# Patient Record
Sex: FEMALE | Race: WHITE | Employment: PART TIME | URBAN - METROPOLITAN AREA
[De-identification: names, ages, dates, MRNs, and addresses within clinical notes are randomized per-mention and may not be internally consistent; named-entity substitution may affect disease eponyms.]

---

## 2021-03-30 ENCOUNTER — HOSPITAL ENCOUNTER (OUTPATIENT)
Age: 51
Discharge: HOME | End: 2021-04-01

## 2021-03-30 PROCEDURE — 88360 TUMOR IMMUNOHISTOCHEM/MANUAL: CPT

## 2021-03-30 PROCEDURE — 88305 TISSUE EXAM BY PATHOLOGIST: CPT

## 2021-05-03 ENCOUNTER — HOSPITAL ENCOUNTER (OUTPATIENT)
Age: 51
Discharge: HOME | End: 2021-05-05

## 2021-05-03 PROCEDURE — 88307 TISSUE EXAM BY PATHOLOGIST: CPT

## 2021-05-04 ENCOUNTER — HOSPITAL ENCOUNTER (OUTPATIENT)
Age: 51
Discharge: HOME | End: 2021-05-06

## 2021-05-04 LAB
ANION GAP SERPL CALCULATED.3IONS-SCNC: 9 MMOL/L (ref 7–16)
BUN BLDV-MCNC: 10 MG/DL (ref 6–20)
CALCIUM SERPL-MCNC: 9.1 MG/DL (ref 8.6–10.2)
CHLORIDE BLD-SCNC: 104 MMOL/L (ref 98–107)
CO2: 27 MMOL/L (ref 22–29)
CREAT SERPL-MCNC: 0.8 MG/DL (ref 0.5–1)
GFR AFRICAN AMERICAN: >60
GFR NON-AFRICAN AMERICAN: >60 ML/MIN/1.73
GLUCOSE BLD-MCNC: 106 MG/DL (ref 74–99)
HCT VFR BLD CALC: 36.2 % (ref 34–48)
HEMOGLOBIN: 11.7 G/DL (ref 11.5–15.5)
POTASSIUM SERPL-SCNC: 4.2 MMOL/L (ref 3.5–5)
SODIUM BLD-SCNC: 140 MMOL/L (ref 132–146)

## 2021-05-04 PROCEDURE — 85014 HEMATOCRIT: CPT

## 2021-05-04 PROCEDURE — 85018 HEMOGLOBIN: CPT

## 2021-05-04 PROCEDURE — 80048 BASIC METABOLIC PNL TOTAL CA: CPT

## 2021-05-14 ENCOUNTER — APPOINTMENT (OUTPATIENT)
Dept: ULTRASOUND IMAGING | Age: 51
End: 2021-05-14
Payer: COMMERCIAL

## 2021-05-14 ENCOUNTER — HOSPITAL ENCOUNTER (EMERGENCY)
Age: 51
Discharge: HOME | End: 2021-05-14
Attending: EMERGENCY MEDICINE
Payer: COMMERCIAL

## 2021-05-14 VITALS
HEART RATE: 72 BPM | DIASTOLIC BLOOD PRESSURE: 112 MMHG | WEIGHT: 190 LBS | TEMPERATURE: 97.4 F | BODY MASS INDEX: 37.3 KG/M2 | SYSTOLIC BLOOD PRESSURE: 189 MMHG | RESPIRATION RATE: 16 BRPM | HEIGHT: 60 IN | OXYGEN SATURATION: 99 %

## 2021-05-14 DIAGNOSIS — I82.621 ACUTE DEEP VEIN THROMBOSIS (DVT) OF OTHER VEIN OF RIGHT UPPER EXTREMITY (HCC): Primary | ICD-10-CM

## 2021-05-14 LAB
ALBUMIN SERPL-MCNC: 3.9 G/DL (ref 3.5–5.2)
ALP BLD-CCNC: 67 U/L (ref 35–104)
ALT SERPL-CCNC: 12 U/L (ref 0–32)
ANION GAP SERPL CALCULATED.3IONS-SCNC: 7 MMOL/L (ref 7–16)
AST SERPL-CCNC: 14 U/L (ref 0–31)
BASOPHILS ABSOLUTE: 0.02 E9/L (ref 0–0.2)
BASOPHILS RELATIVE PERCENT: 0.2 % (ref 0–2)
BILIRUB SERPL-MCNC: <0.2 MG/DL (ref 0–1.2)
BUN BLDV-MCNC: 11 MG/DL (ref 6–20)
CALCIUM SERPL-MCNC: 9.6 MG/DL (ref 8.6–10.2)
CHLORIDE BLD-SCNC: 100 MMOL/L (ref 98–107)
CO2: 31 MMOL/L (ref 22–29)
CREAT SERPL-MCNC: 0.7 MG/DL (ref 0.5–1)
EOSINOPHILS ABSOLUTE: 0.46 E9/L (ref 0.05–0.5)
EOSINOPHILS RELATIVE PERCENT: 4.7 % (ref 0–6)
GFR AFRICAN AMERICAN: >60
GFR NON-AFRICAN AMERICAN: >60 ML/MIN/1.73
GLUCOSE BLD-MCNC: 83 MG/DL (ref 74–99)
HCT VFR BLD CALC: 37.1 % (ref 34–48)
HEMOGLOBIN: 11.8 G/DL (ref 11.5–15.5)
IMMATURE GRANULOCYTES #: 0.08 E9/L
IMMATURE GRANULOCYTES %: 0.8 % (ref 0–5)
LYMPHOCYTES ABSOLUTE: 3.11 E9/L (ref 1.5–4)
LYMPHOCYTES RELATIVE PERCENT: 31.6 % (ref 20–42)
MCH RBC QN AUTO: 28.7 PG (ref 26–35)
MCHC RBC AUTO-ENTMCNC: 31.8 % (ref 32–34.5)
MCV RBC AUTO: 90.3 FL (ref 80–99.9)
MONOCYTES ABSOLUTE: 0.68 E9/L (ref 0.1–0.95)
MONOCYTES RELATIVE PERCENT: 6.9 % (ref 2–12)
NEUTROPHILS ABSOLUTE: 5.48 E9/L (ref 1.8–7.3)
NEUTROPHILS RELATIVE PERCENT: 55.8 % (ref 43–80)
PDW BLD-RTO: 13.8 FL (ref 11.5–15)
PLATELET # BLD: 314 E9/L (ref 130–450)
PMV BLD AUTO: 9.9 FL (ref 7–12)
POTASSIUM SERPL-SCNC: 4.2 MMOL/L (ref 3.5–5)
RBC # BLD: 4.11 E12/L (ref 3.5–5.5)
SODIUM BLD-SCNC: 138 MMOL/L (ref 132–146)
TOTAL PROTEIN: 6.6 G/DL (ref 6.4–8.3)
WBC # BLD: 9.8 E9/L (ref 4.5–11.5)

## 2021-05-14 PROCEDURE — 85025 COMPLETE CBC W/AUTO DIFF WBC: CPT

## 2021-05-14 PROCEDURE — 96372 THER/PROPH/DIAG INJ SC/IM: CPT

## 2021-05-14 PROCEDURE — 99284 EMERGENCY DEPT VISIT MOD MDM: CPT

## 2021-05-14 PROCEDURE — 80053 COMPREHEN METABOLIC PANEL: CPT

## 2021-05-14 PROCEDURE — 6360000002 HC RX W HCPCS: Performed by: STUDENT IN AN ORGANIZED HEALTH CARE EDUCATION/TRAINING PROGRAM

## 2021-05-14 PROCEDURE — 93971 EXTREMITY STUDY: CPT

## 2021-05-14 RX ADMIN — ENOXAPARIN SODIUM 90 MG: 100 INJECTION SUBCUTANEOUS at 23:21

## 2021-05-14 ASSESSMENT — ENCOUNTER SYMPTOMS
PHOTOPHOBIA: 0
VOMITING: 0
NAUSEA: 0
APNEA: 0
DIARRHEA: 0
BACK PAIN: 0
WHEEZING: 0
EYE PAIN: 0
ABDOMINAL PAIN: 0
SHORTNESS OF BREATH: 0
TROUBLE SWALLOWING: 0
COUGH: 0
CONSTIPATION: 0
CHEST TIGHTNESS: 0
RHINORRHEA: 0
SORE THROAT: 0

## 2021-05-14 ASSESSMENT — PAIN DESCRIPTION - DESCRIPTORS: DESCRIPTORS: BURNING

## 2021-05-14 ASSESSMENT — PAIN DESCRIPTION - PAIN TYPE: TYPE: ACUTE PAIN

## 2021-05-14 ASSESSMENT — PAIN DESCRIPTION - ORIENTATION: ORIENTATION: RIGHT

## 2021-05-14 ASSESSMENT — PAIN DESCRIPTION - LOCATION: LOCATION: ARM

## 2021-05-14 ASSESSMENT — PAIN DESCRIPTION - FREQUENCY: FREQUENCY: CONTINUOUS

## 2021-05-15 NOTE — ED PROVIDER NOTES
[facility] ENCOUNTER      Pt Name: Edith Ga  MRN: [id number]  [place] 1970  Date of evaluation: 5/14/2021      CHIEF COMPLAINT       Chief Complaint   Patient presents with    Arm Pain     RUE, pt states pain worsening since wednesday. Area is red and swollen. States had double masectomy on 5/3. Denies CP/SOB. Denies thinners. HPI  Edith Ga is a 52 y.o. female with history of breast cancer status post mastectomy who presents to the emergency department with swelling and redness to the right upper extremity. Patient states that over the last 2 days or so she has had gradually worsening swelling and redness. She describes her symptoms as constant, worsening with time, mild to moderate. Nothing makes her symptoms better. She denies any associated chest pain or shortness of breath. She is not any blood thinners. There were no complications otherwise from the mastectomy. Except as noted above the remainder of the review of systems was reviewed and negative. Review of Systems   Constitutional: Negative for chills, diaphoresis, fatigue and fever. HENT: Negative for rhinorrhea, sore throat and trouble swallowing. Eyes: Negative for photophobia and pain. Respiratory: Negative for apnea, cough, chest tightness, shortness of breath and wheezing. Cardiovascular: Negative for chest pain, palpitations and leg swelling. Gastrointestinal: Negative for abdominal pain, constipation, diarrhea, nausea and vomiting. Endocrine: Negative for polyuria. Genitourinary: Negative for difficulty urinating and dysuria. Musculoskeletal: Negative for back pain, neck pain and neck stiffness. Reports swelling to right arm   Skin: Negative for pallor and rash. Reports erythema to right arm   Neurological: Negative for dizziness, speech difficulty, weakness, light-headedness and headaches. Psychiatric/Behavioral: Negative for confusion. The patient is not nervous/anxious. Physical Exam  Vitals signs and nursing note reviewed. Constitutional:       General: She is not in acute distress. Appearance: She is well-developed. Comments: Awake and alert. Sitting in the gurney in no obvious distress. HENT:      Head: Normocephalic and atraumatic. Right Ear: External ear normal.      Left Ear: External ear normal.      Mouth/Throat:      Mouth: Mucous membranes are moist.   Eyes:      General: No scleral icterus. Pupils: Pupils are equal, round, and reactive to light. Neck:      Musculoskeletal: Normal range of motion and neck supple. Cardiovascular:      Rate and Rhythm: Normal rate and regular rhythm. Heart sounds: No murmur. Comments: 2+ radial and dorsal pedis pulses bilaterally  Pulmonary:      Effort: Pulmonary effort is normal. No respiratory distress. Breath sounds: Normal breath sounds. No wheezing. Abdominal:      Palpations: Abdomen is soft. Tenderness: There is no abdominal tenderness. There is no guarding or rebound. Musculoskeletal: Normal range of motion. General: No tenderness or deformity. Right lower leg: No edema. Left lower leg: No edema. Skin:     General: Skin is warm and dry. Capillary Refill: Capillary refill takes less than 2 seconds. Comments: Mild erythema and mild swelling to the proximal right upper extremity. Neurological:      General: No focal deficit present. Mental Status: She is alert and oriented to person, place, and time. Cranial Nerves: No cranial nerve deficit. Sensory: No sensory deficit. Motor: No weakness or abnormal muscle tone.    Psychiatric:         Mood and Affect: Mood normal.         Behavior: Behavior normal.          Procedures     MDM   This is a 82-year-old female with a history of breast cancer status post mastectomy who presents with right arm swelling and erythema. In the emergency department she is awake and alert, hemodynamic stable, afebrile and in no respiratory distress. Ultrasound showed occlusive thrombus in right cephalic vein consistent with DVT. Lovenox administered. Prescription for Eliquis given. No chest pain or shortness of breath patient nonhypoxic and no signs or symptoms of pulmonary embolism. No signs of infection. Neurovascular intact distally. Discussed plan for charge home as well as return precautions and patient understands and agrees with plan.                  --------------------------------------------- PAST HISTORY ---------------------------------------------  Past Medical History:  has a past medical history of Cancer ([facility] [address] Depression, and Hypertension. Past Surgical History:  has a past surgical history that includes Breast surgery and Cholecystectomy. Social History:  reports that she has never smoked. She has never used smokeless tobacco. She reports previous alcohol use. She reports that she does not use drugs. Family History: family history is not on file. The patients home medications have been reviewed. Allergies: Patient has no known allergies.     -------------------------------------------------- RESULTS -------------------------------------------------  Labs:  Results for orders placed or performed during the hospital encounter of 05/14/21   CBC auto differential   Result Value Ref Range    WBC 9.8 4.5 - 11.5 E9/L    RBC 4.11 3.50 - 5.50 E12/L    Hemoglobin 11.8 11.5 - 15.5 g/dL    Hematocrit 37.1 34.0 - 48.0 %    MCV 90.3 80.0 - 99.9 fL    MCH 28.7 26.0 - 35.0 pg    MCHC 31.8 (L) 32.0 - 34.5 %    RDW 13.8 11.5 - 15.0 fL    Platelets 376 470 - 255 E9/L    MPV 9.9 7.0 - 12.0 fL    Neutrophils % 55.8 43.0 - 80.0 %    Immature Granulocytes % 0.8 0.0 - 5.0 %    Lymphocytes % 31.6 20.0 - 42.0 %    Monocytes % 6.9 2.0 - 12.0 %    Eosinophils % 4.7 0.0 - 6.0 %    Basophils % 0.2 0.0 - 2.0 % Neutrophils Absolute 5.48 1.80 - 7.30 E9/L    Immature Granulocytes # 0.08 E9/L    Lymphocytes Absolute 3.11 1.50 - 4.00 E9/L    Monocytes Absolute 0.68 0.10 - 0.95 E9/L    Eosinophils Absolute 0.46 0.05 - 0.50 E9/L    Basophils Absolute 0.02 0.00 - 0.20 E9/L       Radiology:  US DUP UPPER EXTREMITY RIGHT VENOUS   Final Result   Occlusive thrombus in the right cephalic vein. There is no color flow   identified within this vessel on color Doppler evaluation. Critical results were called by Dr. Anh Solomon to Dr. Kelly Gonzalez on 5/14/2021 at   22:58.             ------------------------- NURSING NOTES AND VITALS REVIEWED ---------------------------  Date / Time Roomed:  5/14/2021 11:00 PM  ED Bed Assignment:  17/17    The nursing notes within the ED encounter and vital signs as below have been reviewed. BP (!) 210/106   Pulse 72   Temp 97.4 °F (36.3 °C) (Oral)   Resp 16   Ht 5' (1.524 m)   Wt 190 lb (86.2 kg)   LMP 05/09/2021   SpO2 99%   BMI 37.11 kg/m²   Oxygen Saturation Interpretation: Normal      ------------------------------------------ PROGRESS NOTES ------------------------------------------  11:24 PM EDT  I have spoken with the patient and discussed todays results, in addition to providing specific details for the plan of care and counseling regarding the diagnosis and prognosis. Their questions are answered at this time and they are agreeable with the plan. I discussed at length with them reasons for immediate return here for re evaluation. They will followup with their primary care physician by calling their office tomorrow. --------------------------------- ADDITIONAL PROVIDER NOTES ---------------------------------  At this time the patient is without objective evidence of an acute process requiring hospitalization or inpatient management. They have remained hemodynamically stable throughout their entire ED visit and are stable for discharge with outpatient follow-up.      The plan has

## 2021-07-21 ENCOUNTER — HOSPITAL ENCOUNTER (OUTPATIENT)
Age: 51
Discharge: HOME | End: 2021-07-23

## 2021-07-21 PROCEDURE — 87075 CULTR BACTERIA EXCEPT BLOOD: CPT

## 2021-07-21 PROCEDURE — 87070 CULTURE OTHR SPECIMN AEROBIC: CPT

## 2021-07-21 PROCEDURE — 87205 SMEAR GRAM STAIN: CPT

## 2021-07-21 PROCEDURE — 88305 TISSUE EXAM BY PATHOLOGIST: CPT

## 2021-07-22 LAB
GRAM STAIN ORDERABLE: NORMAL
GRAM STAIN ORDERABLE: NORMAL

## 2021-07-24 LAB
WOUND/ABSCESS: NORMAL
WOUND/ABSCESS: NORMAL

## 2021-07-26 LAB
ANAEROBIC CULTURE: NORMAL
ANAEROBIC CULTURE: NORMAL

## 2021-09-15 ENCOUNTER — OFFICE VISIT (OUTPATIENT)
Dept: FAMILY MEDICINE CLINIC | Age: 51
End: 2021-09-15
Payer: COMMERCIAL

## 2021-09-15 VITALS
SYSTOLIC BLOOD PRESSURE: 108 MMHG | TEMPERATURE: 96.9 F | DIASTOLIC BLOOD PRESSURE: 70 MMHG | HEART RATE: 84 BPM | OXYGEN SATURATION: 99 % | WEIGHT: 195.6 LBS | HEIGHT: 60 IN | BODY MASS INDEX: 38.4 KG/M2 | RESPIRATION RATE: 16 BRPM

## 2021-09-15 DIAGNOSIS — Z00.00 ROUTINE HEALTH MAINTENANCE: Primary | ICD-10-CM

## 2021-09-15 DIAGNOSIS — Z12.11 COLON CANCER SCREENING: ICD-10-CM

## 2021-09-15 DIAGNOSIS — Z12.4 CERVICAL CANCER SCREENING: ICD-10-CM

## 2021-09-15 PROCEDURE — 99203 OFFICE O/P NEW LOW 30 MIN: CPT | Performed by: STUDENT IN AN ORGANIZED HEALTH CARE EDUCATION/TRAINING PROGRAM

## 2021-09-15 RX ORDER — BENAZEPRIL HYDROCHLORIDE 20 MG/1
TABLET ORAL
COMMUNITY
Start: 2021-07-30 | End: 2022-05-24 | Stop reason: ALTCHOICE

## 2021-09-15 RX ORDER — METOPROLOL SUCCINATE 25 MG/1
TABLET, EXTENDED RELEASE ORAL
COMMUNITY
End: 2022-05-24 | Stop reason: ALTCHOICE

## 2021-09-15 RX ORDER — LORAZEPAM 1 MG/1
1 TABLET ORAL 2 TIMES DAILY PRN
Status: ON HOLD | COMMUNITY
Start: 2021-08-13 | End: 2022-05-31 | Stop reason: HOSPADM

## 2021-09-15 RX ORDER — ASPIRIN 81 MG/1
81 TABLET ORAL DAILY
COMMUNITY

## 2021-09-15 RX ORDER — VENLAFAXINE HYDROCHLORIDE 150 MG/1
150 CAPSULE, EXTENDED RELEASE ORAL DAILY
Status: ON HOLD | COMMUNITY
Start: 2021-07-30 | End: 2022-05-31 | Stop reason: HOSPADM

## 2021-09-15 RX ORDER — FLUTICASONE PROPIONATE 50 MCG
1-2 SPRAY, SUSPENSION (ML) NASAL DAILY PRN
COMMUNITY
End: 2022-06-08

## 2021-09-15 RX ORDER — TAMOXIFEN CITRATE 20 MG/1
20 TABLET ORAL DAILY
COMMUNITY
Start: 2021-06-24

## 2021-09-15 SDOH — ECONOMIC STABILITY: FOOD INSECURITY: WITHIN THE PAST 12 MONTHS, THE FOOD YOU BOUGHT JUST DIDN'T LAST AND YOU DIDN'T HAVE MONEY TO GET MORE.: NEVER TRUE

## 2021-09-15 SDOH — ECONOMIC STABILITY: FOOD INSECURITY: WITHIN THE PAST 12 MONTHS, YOU WORRIED THAT YOUR FOOD WOULD RUN OUT BEFORE YOU GOT MONEY TO BUY MORE.: NEVER TRUE

## 2021-09-15 ASSESSMENT — ENCOUNTER SYMPTOMS
EYE PAIN: 0
NAUSEA: 0
RHINORRHEA: 0
CONSTIPATION: 1
SINUS PRESSURE: 0
COUGH: 0
ABDOMINAL PAIN: 0
EYE REDNESS: 0
BACK PAIN: 0
VOMITING: 0
DIARRHEA: 0
SHORTNESS OF BREATH: 0
SINUS PAIN: 0
SORE THROAT: 0

## 2021-09-15 ASSESSMENT — LIFESTYLE VARIABLES
HOW MANY STANDARD DRINKS CONTAINING ALCOHOL DO YOU HAVE ON A TYPICAL DAY: 1 OR 2
HOW OFTEN DO YOU HAVE A DRINK CONTAINING ALCOHOL: NEVER

## 2021-09-15 ASSESSMENT — PATIENT HEALTH QUESTIONNAIRE - PHQ9
SUM OF ALL RESPONSES TO PHQ QUESTIONS 1-9: 0
2. FEELING DOWN, DEPRESSED OR HOPELESS: 0
SUM OF ALL RESPONSES TO PHQ QUESTIONS 1-9: 0
SUM OF ALL RESPONSES TO PHQ9 QUESTIONS 1 & 2: 0
SUM OF ALL RESPONSES TO PHQ QUESTIONS 1-9: 0
1. LITTLE INTEREST OR PLEASURE IN DOING THINGS: 0

## 2021-09-15 ASSESSMENT — SOCIAL DETERMINANTS OF HEALTH (SDOH): HOW HARD IS IT FOR YOU TO PAY FOR THE VERY BASICS LIKE FOOD, HOUSING, MEDICAL CARE, AND HEATING?: NOT HARD AT ALL

## 2021-09-15 NOTE — PROGRESS NOTES
9/15/2021    Rocio Arroyo (:  1970) is a 52 y.o. female, here for evaluation of the following medical concerns:    HPI  Chief Complaint   Patient presents with    New Patient     Patient is a 61-year-old female here today to establish care with myself. She has a past medical history of hypertension breast cancer anxiety and depression. She has a past surgical history of breast surgery and cholecystectomy. She is allergic to bees. Her father had history of lung cancer and colon cancer runs in the family. She gets a colonoscopy yearly due to this history. She was seeing someone in [place]. She had a breast spacer removed and is getting it put back in on the . She is on the tamoxifen and has been on it for a few months. Her last pap smear was a while ago. She would like referral to OB/GYN. Review of Systems   Constitutional: Negative for chills, fatigue and fever. HENT: Negative for congestion, ear pain, postnasal drip, rhinorrhea, sinus pressure, sinus pain and sore throat. Eyes: Negative for pain and redness. Respiratory: Negative for cough and shortness of breath. Cardiovascular: Negative for chest pain. Gastrointestinal: Positive for constipation. Negative for abdominal pain, diarrhea, nausea and vomiting. Genitourinary: Negative for difficulty urinating and dysuria. Musculoskeletal: Negative for back pain, myalgias and neck pain. Skin: Negative for rash. Neurological: Negative for dizziness, light-headedness and numbness. Psychiatric/Behavioral: The patient is not nervous/anxious. Prior to Visit Medications    Medication Sig Taking?  Authorizing Provider   tamoxifen (NOLVADEX) 20 MG tablet TAKE 1 TABLET BY MOUTH EVERY DAY WHOLE WITH WATER Yes Historical Provider, MD   benazepril (LOTENSIN) 20 MG tablet TAKE 1 TABLET BY MOUTH EVERY DAY Yes Historical Provider, MD   fluticasone (FLONASE) 50 MCG/ACT nasal spray Flonase Allergy Relief 50 mcg/actuation nasal spray,suspension   Spray 1 spray every day by intranasal route. Yes Historical Provider, MD   LORazepam (ATIVAN) 1 MG tablet TAKE 1 TABLET BY MOUTH TWICE A DAY AS NEEDED FOR ANXIETY Yes Historical Provider, MD   metoprolol succinate (TOPROL XL) 25 MG extended release tablet metoprolol succinate ER 25 mg tablet,extended release 24 hr Yes Historical Provider, MD   venlafaxine (EFFEXOR XR) 150 MG extended release capsule TAKE 1 CAPSULE BY MOUTH EVERY DAY Yes Historical Provider, MD   aspirin 81 MG EC tablet Take 81 mg by mouth daily Yes Historical Provider, MD        Allergies   Allergen Reactions    Bee Venom        Past Medical History:   Diagnosis Date    [facility]     [facility]     Depression     Fibromyalgia     Hypertension        Past Surgical History:   Procedure Laterality Date    BREAST SURGERY      CHOLECYSTECTOMY         Social History     Socioeconomic History    Marital status:      Spouse name: Not on file    Number of children: Not on file    Years of education: Not on file    Highest education level: Not on file   Occupational History    Not on file   Tobacco Use    Smoking status: Never Smoker    Smokeless tobacco: Never Used   Substance and Sexual Activity    Alcohol use: Not Currently    Drug use: Never    Sexual activity: Not on file   Other Topics Concern    Not on file   Social History Narrative    Not on file     Social Determinants of Health     Financial Resource Strain: Low Risk     Difficulty of Paying Living Expenses: Not hard at all   Food Insecurity: No Food Insecurity    Worried About Running Out of Food in the Last Year: Never true    Bg of Food in the Last Year: Never true   Transportation Needs:     Lack of Transportation (Medical):      Lack of Transportation (Non-Medical):    Physical Activity:     Days of Exercise per Week:     Minutes of Exercise per Session:    Stress:     Feeling of Stress :    Social Connections:     Frequency of and external ear normal.      Left Ear: Tympanic membrane, ear canal and external ear normal.      Nose: Nose normal.      Mouth/Throat:      Mouth: Mucous membranes are moist.      Pharynx: Oropharynx is clear. Eyes:      Extraocular Movements: Extraocular movements intact. Conjunctiva/sclera: Conjunctivae normal.      Pupils: Pupils are equal, round, and reactive to light. Cardiovascular:      Rate and Rhythm: Normal rate and regular rhythm. Pulses: Normal pulses. Heart sounds: Normal heart sounds. Pulmonary:      Effort: Pulmonary effort is normal.      Breath sounds: Normal breath sounds. Abdominal:      General: Bowel sounds are normal.      Palpations: Abdomen is soft. Musculoskeletal:         General: Normal range of motion. Cervical back: Normal range of motion and neck supple. Skin:     General: Skin is warm and dry. Capillary Refill: Capillary refill takes less than 2 seconds. Neurological:      General: No focal deficit present. Mental Status: She is alert and oriented to person, place, and time. Psychiatric:         Mood and Affect: Mood normal.         Behavior: Behavior normal.         Thought Content: Thought content normal.         ASSESSMENT/PLAN:  Judd Short was seen today for new patient. Diagnoses and all orders for this visit:    Routine health maintenance  -     CBC Auto Differential; Future  -     Comprehensive Metabolic Panel; Future  -     Hemoglobin A1C; Future  -     TSH without Reflex; Future  -     Lipid Panel; Future    Colon cancer screening  -     [facility] [address] [facility]    Cervical cancer screening  -     [address]  OB/GYN           Educational materials and/or home exercises printed for patient's review and were included in patient instructions on his/her After Visit Summary and given to patient at the end of visit.        Counseled regarding above diagnosis, including possible risks and complications, especially if left uncontrolled. Counseled regarding the possible side effects, risks, benefits and alternatives to treatment; patient and/or guardian verbalizes understanding, agrees, feels comfortable with and wishes to proceed with above treatment plan. Advised patient to call with any new medication issues, and read all Rx info from pharmacy to assure aware of all possible risks and side effects of medication before taking. Reviewed age and gender appropriate health screening exams and vaccinations. Advised patient regarding importance of keeping up with recommended health maintenance and to schedule as soon as possible if overdue, as this is important in assessing for undiagnosed pathology, especially cancer, as well as protecting against potentially harmful/life threatening disease. Patient and/or guardian verbalizes understanding and agrees with above counseling, assessment and plan. All questions answered. Return in about 6 months (around 3/15/2022). An  electronic signature was used to authenticate this note.     --Hilda Patterson DO on 9/15/2021 at 10:04 AM

## 2021-12-01 ENCOUNTER — OFFICE VISIT (OUTPATIENT)
Dept: OBGYN | Age: 51
End: 2021-12-01
Payer: COMMERCIAL

## 2021-12-01 VITALS
SYSTOLIC BLOOD PRESSURE: 139 MMHG | DIASTOLIC BLOOD PRESSURE: 64 MMHG | BODY MASS INDEX: 39.62 KG/M2 | WEIGHT: 201.8 LBS | HEIGHT: 60 IN | HEART RATE: 80 BPM

## 2021-12-01 DIAGNOSIS — Z01.419 WOMEN'S ANNUAL ROUTINE GYNECOLOGICAL EXAMINATION: Primary | ICD-10-CM

## 2021-12-01 PROCEDURE — 99386 PREV VISIT NEW AGE 40-64: CPT | Performed by: OBSTETRICS & GYNECOLOGY

## 2021-12-01 PROCEDURE — 99203 OFFICE O/P NEW LOW 30 MIN: CPT | Performed by: OBSTETRICS & GYNECOLOGY

## 2021-12-01 NOTE — PROGRESS NOTES
Patient alert and pleasant with no complaints  Here today for annual GYN visit, last pap approximately 7 years ago. Hx of double mastectomy  Pelvic exam done, pap smear obtained, labeled and hand delivered to lab. Discharge instructions have been discussed with the patient. Patient advised to call our office with any questions or concerns. Voiced understanding.

## 2021-12-01 NOTE — PROGRESS NOTES
HISTORY OF PRESENT ILLNESS:    52 y.o. female  [id number] presents for her annual exam.     Patient's last menstrual period was 2021 (exact date). Periods are: regular  Contraception: tubal  Number of new sexual partners: 0  Most recent pap smear:  normal per pt  History of abnormal pap smears: none  Most recent mammogram:  breast cancer  History of abnormal mammogram: as above  Most recent colonoscopy:  polyps removed  Changes to health since last visit: n/a  Complaints: 2021 breast cancer Stage 2 DCIS in one breast, 2021 double mastectomy, no chemo or radiation. On tamoxifen. Has been having hot flashes and irritability. Some improvement with black cohosh  Pt has expanders in. Sees Dr. Azra Mcclelland       Past Medical History:   Past Medical History:   Diagnosis Date    Cancer [facility])     breast     Depression     Fibromyalgia     Hypertension                                              OB History    Para Term  AB Living   4 4 4     4   SAB IAB Ectopic Molar Multiple Live Births             4      # Outcome Date GA Lbr Ghassan/2nd Weight Sex Delivery Anes PTL Lv   4 Term 95    M Vag-Spont   KARELY   3 Term 92    F Vag-Spont   KARELY   2 Term 90    F Vag-Spont   KARELY   1 Term 89    F Vag-Spont  N KARELY          Past Surgical History:   Past Surgical History:   Procedure Laterality Date    BREAST SURGERY      CHOLECYSTECTOMY      ENDOMETRIAL ABLATION      TUBAL LIGATION          Allergies: Bee venom     Medications:   Current Outpatient Medications   Medication Sig Dispense Refill    tamoxifen (NOLVADEX) 20 MG tablet TAKE 1 TABLET BY MOUTH EVERY DAY WHOLE WITH WATER      benazepril (LOTENSIN) 20 MG tablet TAKE 1 TABLET BY MOUTH EVERY DAY      fluticasone (FLONASE) 50 MCG/ACT nasal spray Flonase Allergy Relief 50 mcg/actuation nasal spray,suspension   Spray 1 spray every day by intranasal route.       LORazepam (ATIVAN) 1 MG tablet TAKE 1 TABLET BY MOUTH TWICE A DAY AS NEEDED FOR ANXIETY      metoprolol succinate (TOPROL XL) 25 MG extended release tablet metoprolol succinate ER 25 mg tablet,extended release 24 hr      venlafaxine (EFFEXOR XR) 150 MG extended release capsule TAKE 1 CAPSULE BY MOUTH EVERY DAY      aspirin 81 MG EC tablet Take 81 mg by mouth daily       No current facility-administered medications for this visit. Social History:   Social History     Tobacco Use    Smoking status: Never Smoker    Smokeless tobacco: Never Used   Substance Use Topics    Alcohol use: Yes     Comment: socially        Family History:   Family History   Problem Relation Age of Onset    Cancer Father        REVIEW OF SYSTEMS:    Constitutional: negative  HEENT: negative  Breast: as above  Respiratory: negative  Cardiovascular: negative  Gastrointestinal: negative  Genitourinary:negative  Integument: negative  Neurological: negative  Endocrine: negative          PHYSICAL EXAM  /64 (Site: Left Upper Arm, Position: Sitting)   Pulse 80   Ht 5' (1.524 m)   Wt 201 lb 12.8 oz (91.5 kg)   LMP 11/07/2021 (Exact Date)   BMI 39.41 kg/m²       General appearance: alert, cooperative and in no acute distress. Head: NCAT   Neck: Neck supple, no mass  Breasts: deferred   Lungs: clear to auscultation bilaterally  Heart: regular rate and rhythm, no murmurs  Abdomen: soft, nontender, nondistended, no masses palpable, no rebound tenderness, no guarding or rigidity  Skin: No suspicious lesions  Neurologic: Alert and oriented, no focal deficits  Extremities: symmetrical without clubbing or cynosis  Psych: Alert, oriented, mood/affect full range, no acute distress    Pelvic Exam:   EXTERNAL GENITALIA: normal external genitalia, no external lesions  VAGINA: normal rugae, no discharge ,Grade 2 rectocele  CERVIX: normal appearing, no lesions, no bleeding  UTERUS: uterus is normal size, shape, consistency and nontender   ADNEXA: normal adnexa in size, nontender and no masses.   RECTUM: hemorrhoid present         ASSESSMENT : Routine Annual Exam,    Diagnosis Orders   1. Women's annual routine gynecological examination  PAP SMEAR        PLAN:    Return in about 1 year (around 12/1/2022) for Annual exam.      No orders of the defined types were placed in this encounter. Orders Placed This Encounter   Procedures    PAP SMEAR     Order Specific Question:   Collection Type     Answer: Thin Prep     Order Specific Question:   Prior Abnormal Pap Test     Answer:   No     Order Specific Question:   Screening or Diagnostic     Answer:   Screening     Order Specific Question:   Additional STD Testing     Answer:   N/A     Order Specific Question:   HPV Requested?      Answer:   HPV Co-Test     Order Specific Question:   High Risk Patient     Answer:   N/A         Electronically signed by Werner Bolton DO on 12/1/21

## 2021-12-06 LAB
HPV SAMPLE: NORMAL
HPV TYPE 16: NOT DETECTED
HPV TYPE 18: NOT DETECTED
HPV, HIGH RISK OTHER: NOT DETECTED
INTERPRETATION: NORMAL
SOURCE: NORMAL

## 2022-01-06 ENCOUNTER — HOSPITAL ENCOUNTER (OUTPATIENT)
Dept: ULTRASOUND IMAGING | Age: 52
Discharge: HOME | End: 2022-01-08
Payer: COMMERCIAL

## 2022-01-06 DIAGNOSIS — I82.611 SUPERFICIAL VENOUS THROMBOSIS OF UPPER EXTREMITY, RIGHT: ICD-10-CM

## 2022-01-06 PROCEDURE — 93971 EXTREMITY STUDY: CPT

## 2022-04-14 ENCOUNTER — HOSPITAL ENCOUNTER (OUTPATIENT)
Age: 52
Discharge: HOME | End: 2022-04-16

## 2022-04-14 PROCEDURE — 88305 TISSUE EXAM BY PATHOLOGIST: CPT

## 2022-05-24 ENCOUNTER — HOSPITAL ENCOUNTER (INPATIENT)
Age: 52
LOS: 7 days | Discharge: ANOTHER HOSPITAL | DRG: 885 | End: 2022-05-31
Attending: PSYCHIATRY & NEUROLOGY | Admitting: PSYCHIATRY & NEUROLOGY
Payer: COMMERCIAL

## 2022-05-24 ENCOUNTER — HOSPITAL ENCOUNTER (EMERGENCY)
Age: 52
Discharge: ANOTHER HOSPITAL | End: 2022-05-24
Attending: EMERGENCY MEDICINE
Payer: COMMERCIAL

## 2022-05-24 VITALS
WEIGHT: 219 LBS | BODY MASS INDEX: 41.35 KG/M2 | HEART RATE: 89 BPM | DIASTOLIC BLOOD PRESSURE: 69 MMHG | RESPIRATION RATE: 15 BRPM | SYSTOLIC BLOOD PRESSURE: 133 MMHG | OXYGEN SATURATION: 98 % | TEMPERATURE: 98 F | HEIGHT: 61 IN

## 2022-05-24 DIAGNOSIS — R45.851 SUICIDAL IDEATION: Primary | ICD-10-CM

## 2022-05-24 DIAGNOSIS — T50.902A INTENTIONAL DRUG OVERDOSE, INITIAL ENCOUNTER (HCC): ICD-10-CM

## 2022-05-24 PROBLEM — T14.91XA SUICIDE ATTEMPT (HCC): Status: ACTIVE | Noted: 2022-05-24

## 2022-05-24 LAB
ACETAMINOPHEN LEVEL: <5 MCG/ML (ref 10–30)
ALBUMIN SERPL-MCNC: 4.1 G/DL (ref 3.5–5.2)
ALP BLD-CCNC: 67 U/L (ref 35–104)
ALT SERPL-CCNC: 13 U/L (ref 0–32)
AMPHETAMINE SCREEN, URINE: NOT DETECTED
ANION GAP SERPL CALCULATED.3IONS-SCNC: 12 MMOL/L (ref 7–16)
AST SERPL-CCNC: 19 U/L (ref 0–31)
BACTERIA: ABNORMAL /HPF
BARBITURATE SCREEN URINE: NOT DETECTED
BASOPHILS ABSOLUTE: 0.04 E9/L (ref 0–0.2)
BASOPHILS RELATIVE PERCENT: 0.6 % (ref 0–2)
BENZODIAZEPINE SCREEN, URINE: POSITIVE
BILIRUB SERPL-MCNC: 0.3 MG/DL (ref 0–1.2)
BILIRUBIN URINE: NEGATIVE
BLOOD, URINE: ABNORMAL
BUN BLDV-MCNC: 11 MG/DL (ref 6–20)
CALCIUM SERPL-MCNC: 9.1 MG/DL (ref 8.6–10.2)
CANNABINOID SCREEN URINE: NOT DETECTED
CHLORIDE BLD-SCNC: 107 MMOL/L (ref 98–107)
CLARITY: CLEAR
CO2: 24 MMOL/L (ref 22–29)
COCAINE METABOLITE SCREEN URINE: NOT DETECTED
COLOR: YELLOW
CREAT SERPL-MCNC: 0.8 MG/DL (ref 0.5–1)
EKG ATRIAL RATE: 81 BPM
EKG P AXIS: 71 DEGREES
EKG P-R INTERVAL: 164 MS
EKG Q-T INTERVAL: 394 MS
EKG QRS DURATION: 68 MS
EKG QTC CALCULATION (BAZETT): 457 MS
EKG R AXIS: 0 DEGREES
EKG T AXIS: 47 DEGREES
EKG VENTRICULAR RATE: 81 BPM
EOSINOPHILS ABSOLUTE: 0.13 E9/L (ref 0.05–0.5)
EOSINOPHILS RELATIVE PERCENT: 1.8 % (ref 0–6)
EPITHELIAL CELLS, UA: ABNORMAL /HPF
ETHANOL: <10 MG/DL (ref 0–0.08)
FENTANYL SCREEN, URINE: NOT DETECTED
GFR AFRICAN AMERICAN: >60
GFR NON-AFRICAN AMERICAN: >60 ML/MIN/1.73
GLUCOSE BLD-MCNC: 114 MG/DL (ref 74–99)
GLUCOSE URINE: NEGATIVE MG/DL
HCT VFR BLD CALC: 37.6 % (ref 34–48)
HEMOGLOBIN: 12 G/DL (ref 11.5–15.5)
IMMATURE GRANULOCYTES #: 0.03 E9/L
IMMATURE GRANULOCYTES %: 0.4 % (ref 0–5)
INFLUENZA A BY PCR: NOT DETECTED
INFLUENZA B BY PCR: NOT DETECTED
KETONES, URINE: NEGATIVE MG/DL
LEUKOCYTE ESTERASE, URINE: ABNORMAL
LYMPHOCYTES ABSOLUTE: 2.02 E9/L (ref 1.5–4)
LYMPHOCYTES RELATIVE PERCENT: 28.1 % (ref 20–42)
Lab: ABNORMAL
MCH RBC QN AUTO: 28.5 PG (ref 26–35)
MCHC RBC AUTO-ENTMCNC: 31.9 % (ref 32–34.5)
MCV RBC AUTO: 89.3 FL (ref 80–99.9)
METHADONE SCREEN, URINE: NOT DETECTED
MONOCYTES ABSOLUTE: 0.56 E9/L (ref 0.1–0.95)
MONOCYTES RELATIVE PERCENT: 7.8 % (ref 2–12)
NEUTROPHILS ABSOLUTE: 4.42 E9/L (ref 1.8–7.3)
NEUTROPHILS RELATIVE PERCENT: 61.3 % (ref 43–80)
NITRITE, URINE: NEGATIVE
OPIATE SCREEN URINE: NOT DETECTED
OXYCODONE URINE: NOT DETECTED
PDW BLD-RTO: 14.7 FL (ref 11.5–15)
PH UA: 6 (ref 5–9)
PHENCYCLIDINE SCREEN URINE: NOT DETECTED
PLATELET # BLD: 267 E9/L (ref 130–450)
PMV BLD AUTO: 10.6 FL (ref 7–12)
POTASSIUM REFLEX MAGNESIUM: 4.4 MMOL/L (ref 3.5–5)
PROTEIN UA: ABNORMAL MG/DL
RBC # BLD: 4.21 E12/L (ref 3.5–5.5)
RBC UA: ABNORMAL /HPF (ref 0–2)
SALICYLATE, SERUM: <0.3 MG/DL (ref 0–30)
SARS-COV-2, NAAT: NOT DETECTED
SODIUM BLD-SCNC: 143 MMOL/L (ref 132–146)
SPECIFIC GRAVITY UA: 1.02 (ref 1–1.03)
TOTAL PROTEIN: 6.9 G/DL (ref 6.4–8.3)
TRICYCLIC ANTIDEPRESSANTS SCREEN SERUM: NEGATIVE NG/ML
UROBILINOGEN, URINE: 0.2 E.U./DL
WBC # BLD: 7.2 E9/L (ref 4.5–11.5)
WBC UA: ABNORMAL /HPF (ref 0–5)

## 2022-05-24 PROCEDURE — 1240000000 HC EMOTIONAL WELLNESS R&B

## 2022-05-24 PROCEDURE — 80143 DRUG ASSAY ACETAMINOPHEN: CPT

## 2022-05-24 PROCEDURE — 80307 DRUG TEST PRSMV CHEM ANLYZR: CPT

## 2022-05-24 PROCEDURE — 87502 INFLUENZA DNA AMP PROBE: CPT

## 2022-05-24 PROCEDURE — 87635 SARS-COV-2 COVID-19 AMP PRB: CPT

## 2022-05-24 PROCEDURE — 82077 ASSAY SPEC XCP UR&BREATH IA: CPT

## 2022-05-24 PROCEDURE — 81001 URINALYSIS AUTO W/SCOPE: CPT

## 2022-05-24 PROCEDURE — 99285 EMERGENCY DEPT VISIT HI MDM: CPT

## 2022-05-24 PROCEDURE — 80053 COMPREHEN METABOLIC PANEL: CPT

## 2022-05-24 PROCEDURE — 85025 COMPLETE CBC W/AUTO DIFF WBC: CPT

## 2022-05-24 PROCEDURE — 93005 ELECTROCARDIOGRAM TRACING: CPT | Performed by: STUDENT IN AN ORGANIZED HEALTH CARE EDUCATION/TRAINING PROGRAM

## 2022-05-24 PROCEDURE — 80179 DRUG ASSAY SALICYLATE: CPT

## 2022-05-24 RX ORDER — MAGNESIUM HYDROXIDE/ALUMINUM HYDROXICE/SIMETHICONE 120; 1200; 1200 MG/30ML; MG/30ML; MG/30ML
30 SUSPENSION ORAL PRN
Status: DISCONTINUED | OUTPATIENT
Start: 2022-05-24 | End: 2022-05-31 | Stop reason: HOSPADM

## 2022-05-24 RX ORDER — HALOPERIDOL 5 MG
5 TABLET ORAL EVERY 6 HOURS PRN
Status: DISCONTINUED | OUTPATIENT
Start: 2022-05-24 | End: 2022-05-31 | Stop reason: HOSPADM

## 2022-05-24 RX ORDER — LANOLIN ALCOHOL/MO/W.PET/CERES
3 CREAM (GRAM) TOPICAL NIGHTLY
Status: DISCONTINUED | OUTPATIENT
Start: 2022-05-25 | End: 2022-05-31 | Stop reason: HOSPADM

## 2022-05-24 RX ORDER — ACETAMINOPHEN 325 MG/1
650 TABLET ORAL EVERY 6 HOURS PRN
Status: DISCONTINUED | OUTPATIENT
Start: 2022-05-24 | End: 2022-05-31 | Stop reason: HOSPADM

## 2022-05-24 RX ORDER — CHLORDIAZEPOXIDE HYDROCHLORIDE 25 MG/1
25 CAPSULE, GELATIN COATED ORAL EVERY 4 HOURS
Status: DISCONTINUED | OUTPATIENT
Start: 2022-05-25 | End: 2022-05-28

## 2022-05-24 RX ORDER — EPINEPHRINE 0.3 MG/.3ML
0.3 INJECTION SUBCUTANEOUS PRN
COMMUNITY
End: 2022-07-06 | Stop reason: SDUPTHER

## 2022-05-24 RX ORDER — HALOPERIDOL 5 MG/ML
5 INJECTION INTRAMUSCULAR EVERY 6 HOURS PRN
Status: DISCONTINUED | OUTPATIENT
Start: 2022-05-24 | End: 2022-05-31 | Stop reason: HOSPADM

## 2022-05-24 RX ORDER — DIAZEPAM 5 MG/1
5 TABLET ORAL EVERY 6 HOURS PRN
Status: ON HOLD | COMMUNITY
End: 2022-05-31 | Stop reason: HOSPADM

## 2022-05-24 RX ORDER — HYDROXYZINE PAMOATE 50 MG/1
50 CAPSULE ORAL 3 TIMES DAILY PRN
Status: DISCONTINUED | OUTPATIENT
Start: 2022-05-24 | End: 2022-05-31 | Stop reason: HOSPADM

## 2022-05-24 ASSESSMENT — ENCOUNTER SYMPTOMS
DIARRHEA: 0
WHEEZING: 0
SINUS PRESSURE: 0
SORE THROAT: 0
EYE DISCHARGE: 0
BACK PAIN: 0
VOMITING: 0
EYE PAIN: 0
NAUSEA: 0
ABDOMINAL DISTENTION: 0
SHORTNESS OF BREATH: 0
EYE REDNESS: 0
COUGH: 0

## 2022-05-24 ASSESSMENT — PAIN - FUNCTIONAL ASSESSMENT
PAIN_FUNCTIONAL_ASSESSMENT: 0-10
PAIN_FUNCTIONAL_ASSESSMENT: NONE - DENIES PAIN

## 2022-05-24 NOTE — ED PROVIDER NOTES
[facility]  Department of Emergency Medicine     Written by: Violette Carrasco DO  Patient Name: Jorge Aguero  Attending Provider: Marco Antonio Epperson DO  Admit Date: 2022 11:23 AM  MRN: [id number]                   : 1970        Chief Complaint   Patient presents with    Drug Overdose     Intentional drug overdose at 2230 last evening; suspected ativan and trazadone    - Chief complaint    Patient is a 51-year-old female past medical history of cancer, depression, hypertension fibromyalgia. Patient presents with chief complaint of possible suicide attempt. According to family at the bedside patient has been depressed since a divorce recently as well as the death of her parents. They noted the patient states she took 2 trazodone last night as well as 20 mg of her Ativan. Time of ingestion was around 9 PM last night. Patient has been mildly lethargic since then but is awake alert and oriented x3. She does admit to taking medicines in attempt to harm her self. She states that she has been feeling depressed. Patient denies any other coingestions. She denies any Tylenol or aspirin use. She denies any alcohol use. Patient denies any similar episodes in the past.  States that she has Ativan for anxiety. Patient denies any fevers, chills, nausea, vomiting, chest pain, cough or shortness of breath. The history is provided by the patient. No  was used. Review of Systems   Constitutional: Positive for fatigue. Negative for chills and fever. HENT: Negative for ear pain, sinus pressure and sore throat. Eyes: Negative for pain, discharge and redness. Respiratory: Negative for cough, shortness of breath and wheezing. Cardiovascular: Negative for chest pain. Gastrointestinal: Negative for abdominal distention, diarrhea, nausea and vomiting. Genitourinary: Negative for dysuria and frequency.    Musculoskeletal: Negative for arthralgias and back pain.   Skin: Negative for rash and wound. Neurological: Negative for weakness and headaches. Hematological: Negative for adenopathy. Psychiatric/Behavioral: Positive for dysphoric mood, self-injury and suicidal ideas. All other systems reviewed and are negative. Physical Exam  Vitals and nursing note reviewed. Constitutional:       General: She is not in acute distress. Appearance: Normal appearance. HENT:      Head: Normocephalic and atraumatic. Nose: No congestion or rhinorrhea. Mouth/Throat:      Mouth: Mucous membranes are moist.      Pharynx: Oropharynx is clear. Eyes:      Extraocular Movements: Extraocular movements intact. Pupils: Pupils are equal, round, and reactive to light. Cardiovascular:      Rate and Rhythm: Normal rate and regular rhythm. Heart sounds: No murmur heard. No gallop. Pulmonary:      Effort: Pulmonary effort is normal. No respiratory distress. Breath sounds: No wheezing, rhonchi or rales. Abdominal:      General: Abdomen is flat. Palpations: Abdomen is soft. There is no mass. Tenderness: There is no abdominal tenderness. There is no guarding. Hernia: No hernia is present. Musculoskeletal:         General: No swelling, tenderness or signs of injury. Normal range of motion. Cervical back: Normal range of motion. No rigidity. No muscular tenderness. Skin:     General: Skin is warm and dry. Capillary Refill: Capillary refill takes less than 2 seconds. Neurological:      General: No focal deficit present. Mental Status: She is alert and oriented to person, place, and time. Mental status is at baseline. Psychiatric:         Mood and Affect: Mood normal. Affect is blunt and flat. Speech: Speech is delayed. Behavior: Behavior normal.         Thought Content: Thought content includes suicidal ideation. Thought content includes suicidal plan.           Procedures       MDM  Number of Diagnoses or Management Options  Intentional drug overdose, initial encounter ([facility].)  Suicidal ideation  Diagnosis management comments: Patient is a 60-year-old female past med history of cancer, depression, hypertension and fibromyalgia. Patient presents with chief complaint of suicidal ideations with possible drug overdose. Vital signs stable presentation. On physical exam patient is sleepy but easily arousable airway is intact. No focal deficits on neurological exam.  Heart regular rate and rhythm, lungs clear to auscultation bilaterally, abdomen soft nontender no rigidity rebound or guarding. On psychiatric exam patient has blunted affect she is somewhat tearful she does admit to suicidal ideations and states that she took the Xanax and attempt to harm her self. Patient james slipped for safety. Laboratory work obtained CBC unremarkable, CMP unremarkable, serum drug screen negative, flu negative, COVID-negative, urinalysis not indicative infection. Urine drug screen positive for benzos. Findings consistent with depression with suicidal ideations in the setting of intentional drug overdose. Social work consult placed. Patient pink slipped due to suicidal ideations and attempt. Patient medically cleared for psychiatric evaluation. Final disposition pending  evaluation. Amount and/or Complexity of Data Reviewed  Clinical lab tests: ordered and reviewed  Tests in the radiology section of CPT®: ordered and reviewed    Risk of Complications, Morbidity, and/or Mortality  Presenting problems: moderate  Diagnostic procedures: moderate  Management options: moderate    Patient Progress  Patient progress: stable             --------------------------------------------- PAST HISTORY ---------------------------------------------  Past Medical History:  has a [facility] Depression, Fibromyalgia, and Hypertension.     Past Surgical History:  has a past surgical history that includes Breast surgery; Cholecystectomy; Tubal ligation; and Endometrial ablation. Social History:  reports that she has never smoked. She has never used smokeless tobacco. She reports current alcohol use. She reports that she does not use drugs. Family History: family history includes Cancer in her father. The patients home medications have been reviewed.     Allergies: Bee venom    -------------------------------------------------- RESULTS -------------------------------------------------    LABS:  Results for orders placed or performed during the hospital encounter of 05/24/22   RAPID INFLUENZA A/B ANTIGENS    Specimen: Nasopharyngeal   Result Value Ref Range    Influenza A by PCR Not Detected Not Detected    Influenza B by PCR Not Detected Not Detected   COVID-19, Rapid    Specimen: Nasopharyngeal Swab   Result Value Ref Range    SARS-CoV-2, NAAT Not Detected Not Detected   CBC with Auto Differential   Result Value Ref Range    WBC 7.2 4.5 - 11.5 E9/L    RBC 4.21 3.50 - 5.50 E12/L    Hemoglobin 12.0 11.5 - 15.5 g/dL    Hematocrit 37.6 34.0 - 48.0 %    MCV 89.3 80.0 - 99.9 fL    MCH 28.5 26.0 - 35.0 pg    MCHC 31.9 (L) 32.0 - 34.5 %    RDW 14.7 11.5 - 15.0 fL    Platelets 137 947 - 903 E9/L    MPV 10.6 7.0 - 12.0 fL    Neutrophils % 61.3 43.0 - 80.0 %    Immature Granulocytes % 0.4 0.0 - 5.0 %    Lymphocytes % 28.1 20.0 - 42.0 %    Monocytes % 7.8 2.0 - 12.0 %    Eosinophils % 1.8 0.0 - 6.0 %    Basophils % 0.6 0.0 - 2.0 %    Neutrophils Absolute 4.42 1.80 - 7.30 E9/L    Immature Granulocytes # 0.03 E9/L    Lymphocytes Absolute 2.02 1.50 - 4.00 E9/L    Monocytes Absolute 0.56 0.10 - 0.95 E9/L    Eosinophils Absolute 0.13 0.05 - 0.50 E9/L    Basophils Absolute 0.04 0.00 - 0.20 E9/L   Comprehensive Metabolic Panel w/ Reflex to MG   Result Value Ref Range    Sodium 143 132 - 146 mmol/L    Potassium reflex Magnesium 4.4 3.5 - 5.0 mmol/L    Chloride 107 98 - 107 mmol/L    CO2 24 22 - 29 mmol/L    Anion Gap 12 7 - 16 mmol/L    Glucose 114 (H) 74 - 99 mg/dL    BUN 11 6 - 20 mg/dL    CREATININE 0.8 0.5 - 1.0 mg/dL    GFR Non-African American >60 >=60 mL/min/1.73    GFR African American >60     Calcium 9.1 8.6 - 10.2 mg/dL    Total Protein 6.9 6.4 - 8.3 g/dL    Albumin 4.1 3.5 - 5.2 g/dL    Total Bilirubin 0.3 0.0 - 1.2 mg/dL    Alkaline Phosphatase 67 35 - 104 U/L    ALT 13 0 - 32 U/L    AST 19 0 - 31 U/L   Urinalysis with Microscopic   Result Value Ref Range    Color, UA Yellow Straw/Yellow    Clarity, UA Clear Clear    Glucose, Ur Negative Negative mg/dL    Bilirubin Urine Negative Negative    Ketones, Urine Negative Negative mg/dL    Specific Gravity, UA 1.020 1.005 - 1.030    Blood, Urine TRACE-INTACT Negative    pH, UA 6.0 5.0 - 9.0    Protein, UA TRACE Negative mg/dL    Urobilinogen, Urine 0.2 <2.0 E.U./dL    Nitrite, Urine Negative Negative    Leukocyte Esterase, Urine TRACE (A) Negative    WBC, UA 1-3 0 - 5 /HPF    RBC, UA 0-1 0 - 2 /HPF    Epithelial Cells, UA MANY /HPF    Bacteria, UA FEW (A) None Seen /HPF   Serum Drug Screen   Result Value Ref Range    Ethanol Lvl <10 mg/dL    Acetaminophen Level <5.0 (L) 10.0 - 78.8 mcg/mL    Salicylate, Serum <1.2 0.0 - 30.0 mg/dL   Urine Drug Screen   Result Value Ref Range    Amphetamine Screen, Urine NOT DETECTED Negative <1000 ng/mL    Barbiturate Screen, Ur NOT DETECTED Negative < 200 ng/mL    Benzodiazepine Screen, Urine POSITIVE (A) Negative < 200 ng/mL    Cannabinoid Scrn, Ur NOT DETECTED Negative < 50ng/mL    Cocaine Metabolite Screen, Urine NOT DETECTED Negative < 300 ng/mL    Opiate Scrn, Ur NOT DETECTED Negative < 300ng/mL    PCP Screen, Urine NOT DETECTED Negative < 25 ng/mL    Methadone Screen, Urine NOT DETECTED Negative <300 ng/mL    Oxycodone Urine NOT DETECTED Negative <100 ng/mL    FENTANYL SCREEN, URINE NOT DETECTED Negative <1 ng/mL    Drug Screen Comment: see below    EKG 12 Lead   Result Value Ref Range    Ventricular Rate 81 BPM    Atrial Rate 81 BPM    P-R Interval 164 ms    QRS Duration 68 ms    Q-T Interval 394 ms    QTc Calculation (Bazett) 457 ms    P Axis 71 degrees    R Axis 0 degrees    T Axis 47 degrees       RADIOLOGY:  No orders to display       EKG: This EKG is signed and interpreted by me. Rate: 81  Rhythm: Sinus  Interpretation: EKG obtained demonstrated normal sinus rhythm, rate 81, normal axis, , no acute ST segment changes. Comparison: stable as compared to patient's most recent EKG      ------------------------- NURSING NOTES AND VITALS REVIEWED ---------------------------  Date / Time Roomed:  5/24/2022 11:23 AM  ED Bed Assignment:  06/06    The nursing notes within the ED encounter and vital signs as below have been reviewed. Patient Vitals for the past 24 hrs:   BP Temp Temp src Pulse Resp SpO2 Height Weight   05/24/22 1134 -- -- -- -- -- -- -- 219 lb (99.3 kg)   05/24/22 1132 135/60 98.1 °F (36.7 °C) Oral 85 16 96 % 5' 1\" (1.549 m) 200 lb (90.7 kg)       Oxygen Saturation Interpretation: Normal    ------------------------------------------ PROGRESS NOTES ------------------------------------------  Re-evaluation(s):  Time:1520  Patients symptoms show no change  Repeat physical examination is not changed    Counseling:  I have spoken with the patient and discussed todays results, in addition to providing specific details for the plan of care and counseling regarding the diagnosis and prognosis. Their questions are answered at this time and they are agreeable with the plan of admission.    --------------------------------- ADDITIONAL PROVIDER NOTES ---------------------------------  Consultations: This patient has remained hemodynamically stable during their ED course. Diagnosis:  1. Suicidal ideation    2. Intentional drug overdose, initial encounter [facility])        Disposition:  Patient's disposition: Admit to mental health unit - medically cleared for admission  Patient's condition is stable.         Patient was seen and evaluated by myself and my attending Marco Antonio Epperson DO. Assessment and Plan discussed with attending provider, please see attestation for final plan of care.      DO Magi Hernandez DO  Resident  05/24/22 5949

## 2022-05-24 NOTE — ED NOTES
Discussed case with Dr. Christine Nova / Sherren Bonito NP Patient to be accepted to [address] [place] [address] diagnosis of suicide attempt. Please ensure original pink slip / voluntary admission accompanies patient's chart. Chapis Avila to inform primary care RN of further admission details once bed is available.        Cami Gill RN  05/24/22 5441

## 2022-05-24 NOTE — ED NOTES
[facility] called for patient transport. ETA 90 minutes to 2 hours. 2115.      Moira Morales RN  05/24/22 1914

## 2022-05-24 NOTE — ED NOTES
This RN received report from [facility].      Mariya Hanson RN  05/24/22 1934

## 2022-05-24 NOTE — CARE COORDINATION
Social Work/Transition of Care:    Pt presented to the ED due to Intentional Overdose, Pt was pinkslipped by the ED PCP.  1600 SW met with pt introduced self and role, Pt signed consent for TeleHealth SW notified PILAR spoke to [facility] Consent along with the Pinkslip was faxed to the [facility] AN AFFILIATE OF [facility] for review.     Electronically signed by Lisandro Spencer on 4/89/1197 at 5:20 PM.

## 2022-05-24 NOTE — ED NOTES
Behavioral Health Crisis Assessment    Assessment via Tele health    Chief Complaint: \"I'm depressed. \"    Mental Status Exam: Pt is alert and oriented x3. Appearance disheveled, Speech is slow and low in tone, eye contact is avoidant, motor activity is slow, mood is flat and very depressed, showing no emotion, denies hallucinations and delusions, currently denying SI and HI, behavior is guarded and withdrawn, circumstantial speech, insight and judgement are poor. Legal Status  [] Voluntary:  [x] Involuntary, Issued by: ED doctor    Gender  [] Male [x] Female [] Transgender  [] Other    Sexual Orientation    [x] Heterosexual [] Homosexual [] Bisexual [] Other    Brief Clinical Summary: Pt is a 54year old female presenting to the ED for suicidal attempt by overdose. Pt reports she took 2 Trazodone and 20mg Ativan last night around 9:00pm. She denies suicidal ideations but per chart pt admitted to taking the medicine in attempts to harm herself. Pt was very lethargic and did not answer all questions when SW asked. Pt took long pauses in between her answers. She states she has been depressed after her mastectomy and the effects of cancer. Pt denies homicidal ideations, auditory and visual hallucinations. Pt does not have a mental health provider and gets her psych medications from her PCP, Dr. Debbie Caceres. Dr. Debbie Caceres prescribes her Effexor and Ativan. Pt reports she has not seen a psychiatrist or counseler in over 15 years. Pt is grieving the loss of her mother, who passed away in February. Pt denies alcohol or substance use. Collateral Information: According to family at the bedside patient has been depressed since a divorce recently as well as the death of her parents. Risk Factors:  Mental health diagnosis  Recent loss of mother in February.    Limited family/friend support   Does not have psych provider    Protective Factors:  Supportive spouse  No access to weapons  Help seeking behavior  Has access to essential needs. Suicidal Ideations:   [x] Reports:  Pt denies ideations but    [] Past [x] Present   [] Denies    Suicide Attempts:  [x] Reports:  Pt intentionally overdosed last night. Unable to determine if there have been previous attempts. [] Denies    C-SSRS Screening Completed by RN: Current Suicide Risk: not completed by nurse  [] None  [] Low [] Moderate [] High    Homicidal Ideations  [] Reports:   [] Past [] Present   [x] Denies     Self Injurious/Self Mutilation Behaviors:   [] Reports:    [] Past [] Present   [x] Denies    Hallucinations/Delusions   [] Reports:   [x] Denies     Substance Use/Alcohol Use/Addiction:   [] Reports:   [x] Denies   [x] SBIRT Screen Complete. Current or Past Substance Abuse Treatment  [] Yes, When and Where:  [x] No    Current or Past Mental Health Treatment:  [] Yes, When and Where:  [x] No    Legal Issues:  []  Yes (Specify)  [x]  No    Access to Weapons:  []  Yes (Specify)  [x]  No    Trauma History  [] Reports:  [x] Denies     Living Situation: unable to assess    Employment: Pt is employed full time but taking time off of work right now. Education Level: GED and LPN certification    Violence Risk Screenin. Have you ever thought about hurting someone? [x]  No  []  Yes (Ask the questions listed below)   When?  Did you follow through with the thoughts? [x] No     [] Yes- When and what happened? 2.  Have you ever threatened anyone? [x]  No  []  Yes (Ask the questions listed below)   When and what happened?  Have you ever threatened someone with a gun, knife or other weapon? [x]  No  []  Yes - When and what happened? 2. Have you ever had an order of protection taken out against you? []  Yes [x]  No  3. Have you ever been arrested due to violence? []  Yes [x]  No  4. Have you ever been cruel to animals?  []  Yes [x]  No    After consideration of C-SSRS screening results, C-SSRS assessments, and this professional's assessment the patient's overall suicide risk assessed to be:  [] None   [] Low   [] Moderate   [x] High     [x] Discussed current suicide risk, protective and risk factors with RN and ED Physician     Disposition   [] Home:   [] Outpatient Provider:   [] Crisis Unit:   [x] Inpatient Psychiatric Unit:  [] Other:                     Bay Alvarado  05/24/22 1821

## 2022-05-24 NOTE — ED NOTES
Per PILAR MIKEY Lopez patient is accepted to [address] [facility] by Dr. Paulina Morrow. Iman/ Kayleen Albarran, NP    Pt to go to room 7304B    [facility].     N2N: [id number]    SW called [facility] and spoke to LORNA ZARATE RN and provided bed info     [place], [place]  05/24/22 1913

## 2022-05-24 NOTE — ED NOTES
Patient has been accepted to [address] [place] [address] by Dr. Naveen Chandler. Room number 7304 B. NTN # [phone].      Linnette Carrizales RN  05/24/22 1911

## 2022-05-25 PROBLEM — F60.89 CLUSTER B PERSONALITY DISORDER (HCC): Status: ACTIVE | Noted: 2022-05-25

## 2022-05-25 PROBLEM — F31.81 BIPOLAR 2 DISORDER, MAJOR DEPRESSIVE EPISODE (HCC): Status: ACTIVE | Noted: 2022-05-25

## 2022-05-25 PROCEDURE — 90792 PSYCH DIAG EVAL W/MED SRVCS: CPT | Performed by: NURSE PRACTITIONER

## 2022-05-25 PROCEDURE — 6370000000 HC RX 637 (ALT 250 FOR IP): Performed by: NURSE PRACTITIONER

## 2022-05-25 PROCEDURE — 6370000000 HC RX 637 (ALT 250 FOR IP): Performed by: PSYCHIATRY & NEUROLOGY

## 2022-05-25 PROCEDURE — 1240000000 HC EMOTIONAL WELLNESS R&B

## 2022-05-25 RX ORDER — CITALOPRAM 10 MG/1
10 TABLET ORAL DAILY
Status: DISCONTINUED | OUTPATIENT
Start: 2022-05-25 | End: 2022-05-31 | Stop reason: HOSPADM

## 2022-05-25 RX ORDER — TAMOXIFEN CITRATE 10 MG/1
20 TABLET ORAL DAILY
Status: DISCONTINUED | OUTPATIENT
Start: 2022-05-25 | End: 2022-05-31 | Stop reason: HOSPADM

## 2022-05-25 RX ORDER — OXCARBAZEPINE 150 MG/1
150 TABLET, FILM COATED ORAL 2 TIMES DAILY
Status: DISCONTINUED | OUTPATIENT
Start: 2022-05-25 | End: 2022-05-31 | Stop reason: HOSPADM

## 2022-05-25 RX ORDER — DIVALPROEX SODIUM 250 MG/1
250 TABLET, DELAYED RELEASE ORAL EVERY 12 HOURS SCHEDULED
Status: DISCONTINUED | OUTPATIENT
Start: 2022-05-25 | End: 2022-05-25

## 2022-05-25 RX ADMIN — CITALOPRAM HYDROBROMIDE 10 MG: 20 TABLET ORAL at 13:22

## 2022-05-25 RX ADMIN — Medication 3 MG: at 22:07

## 2022-05-25 RX ADMIN — CHLORDIAZEPOXIDE HYDROCHLORIDE 25 MG: 25 CAPSULE ORAL at 12:46

## 2022-05-25 RX ADMIN — CHLORDIAZEPOXIDE HYDROCHLORIDE 25 MG: 25 CAPSULE ORAL at 22:08

## 2022-05-25 RX ADMIN — CHLORDIAZEPOXIDE HYDROCHLORIDE 25 MG: 25 CAPSULE ORAL at 16:26

## 2022-05-25 RX ADMIN — TAMOXIFEN CITRATE 20 MG: 10 TABLET, FILM COATED ORAL at 08:32

## 2022-05-25 RX ADMIN — OXCARBAZEPINE 150 MG: 150 TABLET, FILM COATED ORAL at 22:07

## 2022-05-25 RX ADMIN — Medication 3 MG: at 00:54

## 2022-05-25 RX ADMIN — CHLORDIAZEPOXIDE HYDROCHLORIDE 25 MG: 25 CAPSULE ORAL at 08:32

## 2022-05-25 RX ADMIN — OXCARBAZEPINE 150 MG: 150 TABLET, FILM COATED ORAL at 13:22

## 2022-05-25 RX ADMIN — CHLORDIAZEPOXIDE HYDROCHLORIDE 25 MG: 25 CAPSULE ORAL at 00:54

## 2022-05-25 ASSESSMENT — SLEEP AND FATIGUE QUESTIONNAIRES
DO YOU USE A SLEEP AID: NO
AVERAGE NUMBER OF SLEEP HOURS: 4
DO YOU HAVE DIFFICULTY SLEEPING: YES
AVERAGE NUMBER OF SLEEP HOURS: 4
DO YOU HAVE DIFFICULTY SLEEPING: YES
SLEEP PATTERN: DISTURBED/INTERRUPTED SLEEP;RESTLESSNESS
DO YOU USE A SLEEP AID: NO

## 2022-05-25 ASSESSMENT — LIFESTYLE VARIABLES
HOW OFTEN DO YOU HAVE A DRINK CONTAINING ALCOHOL: MONTHLY OR LESS
HOW OFTEN DO YOU HAVE A DRINK CONTAINING ALCOHOL: NEVER
HOW MANY STANDARD DRINKS CONTAINING ALCOHOL DO YOU HAVE ON A TYPICAL DAY: 1 OR 2
HOW MANY STANDARD DRINKS CONTAINING ALCOHOL DO YOU HAVE ON A TYPICAL DAY: 1 OR 2

## 2022-05-25 ASSESSMENT — PAIN SCALES - GENERAL: PAINLEVEL_OUTOF10: 0

## 2022-05-25 ASSESSMENT — PATIENT HEALTH QUESTIONNAIRE - PHQ9
SUM OF ALL RESPONSES TO PHQ QUESTIONS 1-9: 14
SUM OF ALL RESPONSES TO PHQ QUESTIONS 1-9: 17
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
## 2022-05-25 NOTE — PLAN OF CARE
Patient is alert and oriented x4 she is cooperative with staff and pleasant with peers. She denies SI/HI she is not reporting auditory or visual hallucinations. She has a flat affect with fair eye contact. Patient is taking meds and attending groups. Problem: Self Harm/Suicidality  Goal: Will have no self-injury during hospital stay  Description: INTERVENTIONS:  1. Q 30 MINUTES: Routine safety checks  2. Q SHIFT & PRN: Assess risk to determine if routine checks are adequate to maintain patient safety  5/25/2022 1031 by Julianna Wright RN  Outcome: Progressing     Problem: Self Harm/Suicidality  Goal: Will have no self-injury during hospital stay  Description: INTERVENTIONS:  1. Q 30 MINUTES: Routine safety checks  2. Q SHIFT & PRN: Assess risk to determine if routine checks are adequate to maintain patient safety  5/25/2022 1031 by Julianna Wright RN  Outcome: Progressing     Problem: Anxiety  Goal: Will report anxiety at manageable levels  Description: INTERVENTIONS:  1. Administer medication as ordered  2. Teach and rehearse alternative coping skills  3. Provide emotional support with 1:1 interaction with staff  5/25/2022 1031 by Julianna Wright RN  Outcome: Progressing     Problem: Involuntary Admit  Goal: Will cooperate with staff recommendations and doctor's orders and will demonstrate appropriate behavior  Description: INTERVENTIONS:  1. Treat underlying conditions and offer medication as ordered  2. Educate regarding involuntary admission procedures and rules  3.  Contain excessive/inappropriate behavior per unit and hospital policies  [id number] by Julianna Wright RN  Outcome: Progressing

## 2022-05-25 NOTE — PLAN OF CARE
Problem: Self Harm/Suicidality  Goal: Will have no self-injury during hospital stay  Description: INTERVENTIONS:  1. Q 30 MINUTES: Routine safety checks  2. Q SHIFT & PRN: Assess risk to determine if routine checks are adequate to maintain patient safety  Outcome: Progressing     Problem: Depression  Goal: Will be euthymic at discharge  Description: INTERVENTIONS:  1. Administer medication as ordered  2. Provide emotional support via 1:1 interaction with staff  3. Encourage involvement in milieu/groups/activities  4. Monitor for social isolation  Outcome: Progressing     Problem: Anxiety  Goal: Will report anxiety at manageable levels  Description: INTERVENTIONS:  1. Administer medication as ordered  2. Teach and rehearse alternative coping skills  3. Provide emotional support with 1:1 interaction with staff  Outcome: Progressing     Problem: Involuntary Admit  Goal: Will cooperate with staff recommendations and doctor's orders and will demonstrate appropriate behavior  Description: INTERVENTIONS:  1. Treat underlying conditions and offer medication as ordered  2. Educate regarding involuntary admission procedures and rules  3.  Contain excessive/inappropriate behavior per unit and hospital policies  Outcome: Progressing
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
## 2022-05-25 NOTE — PROGRESS NOTES
[facility]  Initial Interdisciplinary Treatment Plan NOTE    Review Date & Time: 5/25/22 0900    Patient was in treatment team    Admission Type:   Admission Type: Involuntary    Reason for admission:  Reason for Admission: \"I'm  and fell in love with someone else. \"      Estimated Length of Stay Update:  3-5 days  Estimated Discharge Date Update: 5/30/22    EDUCATION:   Learner Progress Toward Treatment Goals: Reviewed goals and plan of care    Method: Small group    Outcome: Verbalized understanding    PATIENT GOALS: \"Take medications\"    PLAN/TREATMENT RECOMMENDATIONS UPDATE: Encourage group participation and medication compliance. Provide emotional support.     GOALS UPDATE:   Time frame for Short-Term Goals: 1-3 days    Jaelyn Cooper RN

## 2022-05-25 NOTE — PSYCHOTHERAPY
[facility]  Admission Note     Admission Type:   Admission Type: Involuntary    Reason for admission:  Reason for Admission: \"I'm  and fell in love with someone else. \"    Addictive Behavior:   Addictive Behavior  In the Past 3 Months, Have You Felt or Has Someone Told You That You Have a Problem With  : None    Medical Problems:   Past Medical History:   Diagnosis Date    Cancer ([facility] [address]     breast     Depression     Fibromyalgia     Hypertension        Status EXAM:  Mental Status and Behavioral Exam  Normal: No  Level of Assistance: Independent/Self  Facial Expression: Flat,Sad  Affect: Blunt  Level of Consciousness: Alert  Frequency of Checks: 4 times per hour, close  Mood:Normal: No  Mood: Depressed,Anxious  Motor Activity:Normal: No  Motor Activity: Decreased  Eye Contact: Fair  Observed Behavior: Cooperative,Friendly,Guarded  Sexual Misconduct History: Current - no  Preception: [place] to person,[place] to time,[place] to place,[place] to situation  Attention:Normal: Yes  Thought Processes: Blocking  Thought Content:Normal: Yes  Depression Symptoms: Change in energy level,Feelings of hopelessess  Anxiety Symptoms: Generalized  Lisbeth Symptoms: No problems reported or observed.   Hallucinations: None  Delusions: No  Memory:Normal: Yes  Insight and Judgment: No  Insight and Judgment: Poor judgment,Poor insight    Tobacco Screening:  Practical Counseling, on admission, suzanne X, if applicable and completed (first 3 are required if patient doesn't refuse):            ( )  Recognizing danger situations (included triggers and roadblocks)                    ( )  Coping skills (new ways to manage stress, exercise, relaxation techniques, changing routine, distraction)                                                           ( )  Basic information about quitting (benefits of quitting, techniques in how to quit, available resources  ( ) Referral for counseling faxed to [facility] ( ) Patient refused counseling  (X) Patient has not smoked in the last 30 days    Metabolic Screening:    Lab Results   Component Value Date    LABA1C 5.5 09/15/2021       Lab Results   Component Value Date    CHOL 179 09/15/2021     Lab Results   Component Value Date    TRIG 185 (H) 09/15/2021     Lab Results   Component Value Date    HDL 59 09/15/2021     No components found for: LDLCAL  Lab Results   Component Value Date    LABVLDL 37 09/15/2021         There is no height or weight on file to calculate BMI. BP Readings from Last 2 Encounters:   05/25/22 115/75   05/24/22 133/69           Pt admitted with followings belongings:  Dental Appliances: None  Vision - Corrective Lenses: None  Hearing Aid: None  Jewelry: None  Body Piercings Removed: N/A  Clothing: Sent home,Other (Comment) (sent with  at [facility] ED)  Other Valuables: At home     Patient oriented to surroundings and program expectations and copy of patient rights given. Received admission packet:  yes. Consents reviewed, signed yes. Patient verbalize understanding:  yes. Patient education on precautions: yes           Patient transferred from [facility] ED. Patient alert and oriented x4. Patient calm, cooperative, pleasant. Affect flat, sad, blunt. Patient denies SI/HI/AVH at this time, but came to ED for suicide attempt on home medications. Patient reports to have taken 20mg of Ativan and a \"couple ultrams. \" Patient states she did this around 9pm on 5/23 and came to the hospital 5/24 in afternoon. Patient reports feeling suicidal over last couple weeks. No previous suicide attempts. Patient reports she is currently going through a divorce, after having an affair, and states her family does not support her decisions. Patient currently resides with her sister, who she states is a good support system for her. Patient also reports losing her mother in February 2022, which was the start of her increase in depression and anxiety. Patient does not follow with outpatient psychiatric care and goes through PCP for medication management. Denies current and past ETOH/drug abuse.     Eliseo Sparks RN

## 2022-05-25 NOTE — ED NOTES
This RN contacted [facility] for an updated ETA. [facility] stated ~45 mins.      Tristan Mccray RN  05/24/22 1837

## 2022-05-26 PROCEDURE — 6370000000 HC RX 637 (ALT 250 FOR IP): Performed by: NURSE PRACTITIONER

## 2022-05-26 PROCEDURE — 99231 SBSQ HOSP IP/OBS SF/LOW 25: CPT | Performed by: NURSE PRACTITIONER

## 2022-05-26 PROCEDURE — 6370000000 HC RX 637 (ALT 250 FOR IP): Performed by: PSYCHIATRY & NEUROLOGY

## 2022-05-26 PROCEDURE — 1240000000 HC EMOTIONAL WELLNESS R&B

## 2022-05-26 RX ADMIN — CHLORDIAZEPOXIDE HYDROCHLORIDE 25 MG: 25 CAPSULE ORAL at 01:55

## 2022-05-26 RX ADMIN — Medication 3 MG: at 20:37

## 2022-05-26 RX ADMIN — CHLORDIAZEPOXIDE HYDROCHLORIDE 25 MG: 25 CAPSULE ORAL at 20:37

## 2022-05-26 RX ADMIN — OXCARBAZEPINE 150 MG: 150 TABLET, FILM COATED ORAL at 20:37

## 2022-05-26 RX ADMIN — OXCARBAZEPINE 150 MG: 150 TABLET, FILM COATED ORAL at 09:08

## 2022-05-26 RX ADMIN — CHLORDIAZEPOXIDE HYDROCHLORIDE 25 MG: 25 CAPSULE ORAL at 09:08

## 2022-05-26 RX ADMIN — CITALOPRAM HYDROBROMIDE 10 MG: 20 TABLET ORAL at 09:08

## 2022-05-26 RX ADMIN — CHLORDIAZEPOXIDE HYDROCHLORIDE 25 MG: 25 CAPSULE ORAL at 13:28

## 2022-05-26 RX ADMIN — CHLORDIAZEPOXIDE HYDROCHLORIDE 25 MG: 25 CAPSULE ORAL at 16:34

## 2022-05-26 RX ADMIN — CHLORDIAZEPOXIDE HYDROCHLORIDE 25 MG: 25 CAPSULE ORAL at 06:41

## 2022-05-26 RX ADMIN — TAMOXIFEN CITRATE 20 MG: 10 TABLET, FILM COATED ORAL at 09:13

## 2022-05-26 ASSESSMENT — PAIN SCALES - GENERAL
PAINLEVEL_OUTOF10: 0
PAINLEVEL_OUTOF10: 0

## 2022-05-26 NOTE — PLAN OF CARE
Patient has been isolative to room this shift. Affect flat and blunt. Patient is evasive during conversation and provides minimal feedback. Patient denies SI/HI/AVH and depression, but rates her anxiety 4/10. Patient continuously states, \"I am just tired,\" when asked about her stressors. Compliant with medications and in control of behaviors. Will continue to monitor and provide support. Problem: Self Harm/Suicidality  Goal: Will have no self-injury during hospital stay  Description: INTERVENTIONS:  1. Q 30 MINUTES: Routine safety checks  2. Q SHIFT & PRN: Assess risk to determine if routine checks are adequate to maintain patient safety  5/25/2022 2110 by Brie Baumann RN  Outcome: Progressing     Problem: Depression  Goal: Will be euthymic at discharge  Description: INTERVENTIONS:  1. Administer medication as ordered  2. Provide emotional support via 1:1 interaction with staff  3. Encourage involvement in milieu/groups/activities  4. Monitor for social isolation  Outcome: Progressing     Problem: Anxiety  Goal: Will report anxiety at manageable levels  Description: INTERVENTIONS:  1. Administer medication as ordered  2. Teach and rehearse alternative coping skills  3. Provide emotional support with 1:1 interaction with staff  5/25/2022 2110 by Brie Baumann RN  Outcome: Progressing     Problem: Involuntary Admit  Goal: Will cooperate with staff recommendations and doctor's orders and will demonstrate appropriate behavior  Description: INTERVENTIONS:  1. Treat underlying conditions and offer medication as ordered  2. Educate regarding involuntary admission procedures and rules  3.  Contain excessive/inappropriate behavior per unit and hospital policies  [id number] by Brie Baumann RN  Outcome: Progressing

## 2022-05-26 NOTE — PROGRESS NOTES
BEHAVIORAL HEALTH FOLLOW-UP NOTE     5/26/2022     Patient was seen and examined in person, Chart reviewed   Patient's case discussed with staff/team  Mental status examination reveals 54 y o female average hygiene average grooming superficially cooperative and forthcoming for assessment. Psychomotor reveals no agitation retardation involuntary movement or abnormal posture. Speech is clear she is able to answer questions with relevance and there is no latency of response. Eye contact is average during assessment. Mood is not stated. Affect is relaxed calm cooperative. Thought process linear goal-directed no looseness of association no flight of ideas, thought process remains delayed. Thought content devoid of auditory or visual hallucinations there is no overt or covert sign of psychosis or paranoia. Denies any neurovegetative signs of depression. Memory intact during conversation cognitive function baseline. Insight judgment and pulse control improving. Alert and oriented to person place time situation. Chief Complaint: \"I'm tired\"    Interim History:   Patient seen in her room today, while lying in bed. She was slow to respond and responses were very short, but was cooperative. Patient reports she feels \"okay\" but is very tired and thinks she is sleeping too much. She reports her medications might be helping her mood a little bit, but still feels very tired. Denies feeling anxious or aggressive. She continues to feel depressed. Denies SI, HI, or AVH. Patient denies going to group sessions because she is \"not interested\" and is \"too tired\". She did report she has been socializing with other patients. Her goals of treatment are \"to get better\".     Appetite:   [x] Normal/Unchanged  [] Increased  [] Decreased      Sleep:       [] Normal/Unchanged  [x] Fair       [] Poor              Energy:    [] Normal/Unchanged  [] Increased  [x] Decreased        SI [] Present  [x] Absent    HI  []Present  [x] Absent Aggression:  [] yes  [x] no    Patient is [x] able  [] unable to CONTRACT FOR SAFETY     PAST MEDICAL/PSYCHIATRIC HISTORY:   Past Medical History:   Diagnosis Date    Cancer ([facility] [address]     breast     Depression     Fibromyalgia     Hypertension        FAMILY/SOCIAL HISTORY:  Family History   Problem Relation Age of Onset    Cancer Father      Social History     Socioeconomic History    Marital status:      Spouse name: Not on file    Number of children: Not on file    Years of education: Not on file    Highest education level: Not on file   Occupational History    Not on file   Tobacco Use    Smoking status: Never Smoker    Smokeless tobacco: Never Used   Vaping Use    Vaping Use: Never used   Substance and Sexual Activity    Alcohol use: Not Currently     Comment: socially    Drug use: Not Currently    Sexual activity: Not on file   Other Topics Concern    Not on file   Social History Narrative    Not on file     Social Determinants of Health     Financial Resource Strain: Low Risk     Difficulty of Paying Living Expenses: Not hard at all   Food Insecurity: No Food Insecurity    Worried About [address] in the Last Year: Never true    [address] in the Last Year: Never true   Transportation Needs:     Lack of Transportation (Medical): Not on file    Lack of Transportation (Non-Medical):  Not on file   Physical Activity:     Days of Exercise per Week: Not on file    Minutes of Exercise per Session: Not on file   Stress:     Feeling of Stress : Not on file   Social Connections:     Frequency of Communication with Friends and Family: Not on file    Frequency of Social Gatherings with Friends and Family: Not on file    Attends Christian Services: Not on file    Active Member of Clubs or Organizations: Not on file    Attends Club or Organization Meetings: Not on file    Marital Status: Not on file   Intimate Partner Violence:     Fear of Current or Ex-Partner: Not on file    Emotionally Abused: Not on file    Physically Abused: Not on file    Sexually Abused: Not on file   Housing Stability:     Unable to Pay for Housing in the Last Year: Not on file    Number of Places Lived in the Last Year: Not on file    Unstable Housing in the Last Year: Not on file       ROS:  [x] All negative/unchanged except if checked.  Explain positive(checked items) below:  [] Constitutional  [] Eyes  [] Ear/Nose/Mouth/Throat  [] Respiratory  [] CV  [] GI  []   [] Musculoskeletal  [] Skin/Breast  [] Neurological  [] Endocrine  [] Heme/Lymph  [] Allergic/Immunologic    Explanation:     MEDICATIONS:    Current Facility-Administered Medications:     tamoxifen (NOLVADEX) tablet 20 mg, 20 mg, Oral, Daily, Juvenal Valerio MD, 20 mg at 05/26/22 0913    OXcarbazepine (TRILEPTAL) tablet 150 mg, 150 mg, Oral, BID, Chata Dense, APRN - CNP, 150 mg at 05/26/22 0908    citalopram (CELEXA) tablet 10 mg, 10 mg, Oral, Daily, Chata Dense, APRN - CNP, 10 mg at 05/26/22 0908    acetaminophen (TYLENOL) tablet 650 mg, 650 mg, Oral, Q6H PRN, Sissy Zavala MD    magnesium hydroxide (MILK OF MAGNESIA) 400 MG/5ML suspension 30 mL, 30 mL, Oral, Daily PRN, Sissy Zavala MD    aluminum & magnesium hydroxide-simethicone (MAALOX) 200-200-20 MG/5ML suspension 30 mL, 30 mL, Oral, PRN, Sissy Zavala MD    hydrOXYzine (VISTARIL) capsule 50 mg, 50 mg, Oral, TID PRN, Sissy Zavala MD    haloperidol (HALDOL) tablet 5 mg, 5 mg, Oral, Q6H PRN **OR** haloperidol lactate (HALDOL) injection 5 mg, 5 mg, IntraMUSCular, Q6H PRN, Sissy Zavala MD    melatonin tablet 3 mg, 3 mg, Oral, Nightly, Sissy Zavala MD, 3 mg at 05/25/22 2207    chlordiazePOXIDE (LIBRIUM) capsule 25 mg, 25 mg, Oral, Q4H, Juvenal Valerio MD, 25 mg at 05/26/22 0908      Examination:  /66   Pulse 89   Temp 98.6 °F (37 °C) (Temporal)   Resp 15   LMP  (LMP Unknown)   SpO2 95%   Gait - steady  Medication side effects(SE): denies    Mental Status Examination:    Level of consciousness:  within normal limits   Appearance:  poor grooming and poor hygiene  Behavior/Motor:  psychomotor retardation  Attitude toward examiner:  cooperative  Speech:  normal volume, well articulated and slow   Mood: depressed  Affect:  mood congruent and flat  Thought processes:  linear   Thought content: Thought contents are devoid of any AVH, delusions, or any other perceptual abnormalities. Patient does not seem to be internally stimulated, paranoid, or suspicious. Patient denies thought broadcasting, ideas of reference, and inception. Patient denies SI and HI. Cognition:  oriented to person, place, and time   Concentration intact  Insight poor  Judgement por     ASSESSMENT:   Patient symptoms are:  [] Well controlled  [] Improving  [] Worsening  [x] No change      Diagnosis:   Principal Problem:    Bipolar 2 disorder, major depressive episode ([facility] [address]  Active Problems:    Cluster B personality traits ([facility]  Resolved Problems:    * No resolved hospital problems.  *      LABS:    Recent Labs     05/24/22  1216   WBC 7.2   HGB 12.0        Recent Labs     05/24/22  1216      K 4.4      CO2 24   BUN 11   CREATININE 0.8   GLUCOSE 114*     Recent Labs     05/24/22  1216   BILITOT 0.3   ALKPHOS 67   AST 19   ALT 13     Lab Results   Component Value Date    LABAMPH NOT DETECTED 05/24/2022    BARBSCNU NOT DETECTED 05/24/2022    LABBENZ POSITIVE 05/24/2022    LABMETH NOT DETECTED 05/24/2022    OPIATESCREENURINE NOT DETECTED 05/24/2022    PHENCYCLIDINESCREENURINE NOT DETECTED 05/24/2022    ETOH <10 05/24/2022     Lab Results   Component Value Date    TSH 2.500 09/15/2021     No results found for: LITHIUM  No results found for: VALPROATE, CBMZ      Treatment Plan:  The patient's diagnosis, treatment plan, medication management were formulated after patient was seen directly by the attending physician and myself and all relevant documentation was reviewed.     Risk, benefit, side effects, possible outcomes of the medication and alternatives discussed with the patient and the patient demonstrated understanding.  The patient was also educated that the outcome of treatment will depend on the medication compliance as directed by the prescribers along with regular follow-up, compliance with the labs and other work-up, as clinically indicated.     Celexa 10 mg daily for depression anxiety and impulsivity related to cluster B personality traits  Trileptal 150 mg twice daily we will consider optimizing this medication if clinically indicated for mood stability     Collateral information: Followed by social work  CD evaluation  Encourage patient to attend group and other milieu activities.   Discharge planning discussed with the patient and treatment team.    PSYCHOTHERAPY/COUNSELING:  [x] Therapeutic interview  [x] Supportive  [] CBT  [] Ongoing  [] Other    [x] Patient continues to need, on a daily basis, active treatment furnished directly by or requiring the supervision of inpatient psychiatric personnel      Anticipated Length of stay: 5-7 days based on stability       Electronically signed by Ric Leyva on 5/26/2022 at 10:58 AM

## 2022-05-26 NOTE — PROGRESS NOTES
Patient has been intermittently out on the unit. Patient is pleasant, calm, and cooperative. Patient denies all and is anxious. Patient has been receiving the Librium Q4 hours. Patient is flat and blunt. Patient is encouraged to continue to work towards discharge goal by complying with medications, attending groups and to seek staff if feelings are overwhelming. Environmental rounds completed per unit policy to maintain safety of everyone on the unit. Staff will offer support and interventions as requested or required.

## 2022-05-26 NOTE — PROGRESS NOTES
Patient , Joshua Bowden, called to speak to nurse. Joshua Bowden states patient had a suicide attempt 15 years ago by OD after having affair.

## 2022-05-27 PROCEDURE — 99231 SBSQ HOSP IP/OBS SF/LOW 25: CPT | Performed by: NURSE PRACTITIONER

## 2022-05-27 PROCEDURE — 6370000000 HC RX 637 (ALT 250 FOR IP): Performed by: NURSE PRACTITIONER

## 2022-05-27 PROCEDURE — 6370000000 HC RX 637 (ALT 250 FOR IP): Performed by: PSYCHIATRY & NEUROLOGY

## 2022-05-27 PROCEDURE — 1240000000 HC EMOTIONAL WELLNESS R&B

## 2022-05-27 RX ADMIN — OXCARBAZEPINE 150 MG: 150 TABLET, FILM COATED ORAL at 21:30

## 2022-05-27 RX ADMIN — OXCARBAZEPINE 150 MG: 150 TABLET, FILM COATED ORAL at 09:47

## 2022-05-27 RX ADMIN — CITALOPRAM HYDROBROMIDE 10 MG: 20 TABLET ORAL at 09:47

## 2022-05-27 RX ADMIN — Medication 3 MG: at 21:30

## 2022-05-27 RX ADMIN — CHLORDIAZEPOXIDE HYDROCHLORIDE 25 MG: 25 CAPSULE ORAL at 09:47

## 2022-05-27 RX ADMIN — CHLORDIAZEPOXIDE HYDROCHLORIDE 25 MG: 25 CAPSULE ORAL at 16:52

## 2022-05-27 RX ADMIN — CHLORDIAZEPOXIDE HYDROCHLORIDE 25 MG: 25 CAPSULE ORAL at 12:56

## 2022-05-27 RX ADMIN — TAMOXIFEN CITRATE 20 MG: 10 TABLET, FILM COATED ORAL at 09:47

## 2022-05-27 ASSESSMENT — PAIN SCALES - GENERAL: PAINLEVEL_OUTOF10: 0
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
## 2022-05-27 NOTE — GROUP NOTE
Group Therapy Note    Date: 5/27/2022    Group Start Time: 1000  Group End Time: 2460  Group Topic: Psychotherapy    [facility]  [address], [facility]        Group Therapy Note    Attendees: 10         Patient's Goal:  To increase social interaction and improve relationships with others.      Notes:  Pt was attentive in group and was able to identify and agenda. Pt was able to verbalize relating to other and made connections. Status After Intervention:  Unchanged    Participation Level:  Active Listener    Participation Quality: Appropriate and Attentive      Speech:  mute      Thought Process/Content: Logical      Affective Functioning: Flat      Mood: depressed      Level of consciousness:  Alert, Oriented x4 and Attentive      Response to Learning: Able to verbalize current knowledge/experience, Able to verbalize/acknowledge new learning and Able to retain information      Endings: None Reported    Modes of Intervention: Support, Socialization and Exploration      Discipline Responsible: /Counselor      Signature:  MATILDE Garcia, [place]

## 2022-05-27 NOTE — PLAN OF CARE
Patient up and about the unit watching TV and talking with  on the phone. Patient is flat but pleasant denies SI/HI AVH depression and anxiety. Will continue to monitor and assess q 15 min for safety throughout the shift. Problem: Safety - Adult  Goal: Free from fall injury  Outcome: Progressing     Problem: Self Harm/Suicidality  Goal: Will have no self-injury during hospital stay  Description: INTERVENTIONS:  1. Q 30 MINUTES: Routine safety checks  2. Q SHIFT & PRN: Assess risk to determine if routine checks are adequate to maintain patient safety  Outcome: Progressing     Problem: Depression  Goal: Will be euthymic at discharge  Description: INTERVENTIONS:  1. Administer medication as ordered  2. Provide emotional support via 1:1 interaction with staff  3. Encourage involvement in milieu/groups/activities  4. Monitor for social isolation  Outcome: Progressing     Problem: Anxiety  Goal: Will report anxiety at manageable levels  Description: INTERVENTIONS:  1. Administer medication as ordered  2. Teach and rehearse alternative coping skills  3. Provide emotional support with 1:1 interaction with staff  Outcome: Berenice Ureña (Taken 5/26/2022 0800 by Cyn Stanford RN)  Will report anxiety at manageable levels:   Administer medication as ordered   Provide emotional support with 1:1 interaction with staff   Teach and rehearse alternative coping skills     Problem: Involuntary Admit  Goal: Will cooperate with staff recommendations and doctor's orders and will demonstrate appropriate behavior  Description: INTERVENTIONS:  1. Treat underlying conditions and offer medication as ordered  2. Educate regarding involuntary admission procedures and rules  3.  Contain excessive/inappropriate behavior per unit and hospital policies  Outcome: Progressing  Flowsheets (Taken 5/26/2022 0800 by Cyn Stanford RN)  Will cooperate with staff recommendations and doctor's orders and will demonstrate appropriate behavior:   Treat underlying conditions and offer medication as ordered   Contain excessive/inappropriate behavior per unit and hospital policies     Problem: Pain  Goal: Verbalizes/displays adequate comfort level or baseline comfort level  Outcome: Progressing

## 2022-05-27 NOTE — CARE COORDINATION
Denys met with pt to discuss follow up providers. Sw presented pt with different follow up providers by where pt lives. Pt did not express preference in follow up provider. Denys contacted [address] [phone]. They report that they are not taking on new patients as their practice is closing. Denys contacted [facility]  and left voicemail. Sw contacted [facility] . Sw was informed that the office at this location is no longer open. Denys contacted [facility] [phone] and left voicemail. Denys contacted [address] [place] [address]  and left voicemail. Denys asked pt if pt was able to get sister Bozena's phone number. Pt denies having this number yet, will attempt to get it sometime today.

## 2022-05-27 NOTE — GROUP NOTE
Group Therapy Note    Date: 5/27/2022    Group Start Time: 1100  Group End Time: 1140  Group Topic: Psychoeducation    [facility]    Karlie Brown, CTRS        Group Therapy Note      Number of participants: 8  Type of group: Psychoeducation  Mode of intervention: Education, Support, Socialization, Exploration, Clarifying, and Problem-solving  Topic: Being More Honest with Yourself  Objective: Pt will identify 1 way to be more honest with themselves in recovery. Patient's Goal:  Pt reports no goal.     Notes:  Pt interactive during group sharing 1 way to be more honest in recovery. Pt gave support and feedback to others. Status After Intervention:  Improved    Participation Level:  Active Listener and Interactive    Participation Quality: Appropriate, Attentive, Sharing and Supportive      Speech:  normal      Thought Process/Content: Logical      Affective Functioning: Congruent      Mood: euthymic      Level of consciousness:  Alert, Oriented x4 and Attentive      Response to Learning: Able to verbalize current knowledge/experience, Able to verbalize/acknowledge new learning, Able to retain information, Capable of insight, Able to change behavior and Progressing to goal      Endings: None Reported    Modes of Intervention: Education, Support, Socialization, Exploration, Clarifying and Problem-solving

## 2022-05-27 NOTE — PLAN OF CARE
Patient A&Ox4. Pt denies SI, HI, hallucinations, anxiety and depression. Patient is flat and cooperative with a blunted affect. Pt is intermittently visible on the unit, attends group and is social with select. Pt is med. & meal compliant. Pt with even and unlabored breathing, no signs of acute physical distress noted. Will continue to monitor and offer support as needed. Problem: Self Harm/Suicidality  Goal: Will have no self-injury during hospital stay  Description: INTERVENTIONS:  1. Q 30 MINUTES: Routine safety checks  2. Q SHIFT & PRN: Assess risk to determine if routine checks are adequate to maintain patient safety  Outcome: Progressing     Problem: Anxiety  Goal: Will report anxiety at manageable levels  Description: INTERVENTIONS:  1. Administer medication as ordered  2. Teach and rehearse alternative coping skills  3. Provide emotional support with 1:1 interaction with staff  Outcome: Progressing     Problem: Involuntary Admit  Goal: Will cooperate with staff recommendations and doctor's orders and will demonstrate appropriate behavior  Description: INTERVENTIONS:  1. Treat underlying conditions and offer medication as ordered  2. Educate regarding involuntary admission procedures and rules  3.  Contain excessive/inappropriate behavior per unit and hospital policies  Outcome: Progressing

## 2022-05-27 NOTE — PROGRESS NOTES
BEHAVIORAL HEALTH FOLLOW-UP NOTE     5/27/2022     Patient was seen and examined in person, Chart reviewed   Patient's case discussed with staff/team      Chief Complaint: \"I'm tired\"    Interim History:   Patient seen in her room today, while lying in bed. She was slow to respond and responses were very short, but was cooperative. Patient reports she feels \"okay\" but is very tired and thinks she is sleeping too much. She reports her medications might be helping her mood a little bit, but still feels very tired. Denies feeling anxious or aggressive. She continues to feel depressed. Denies SI, HI, or AVH. Patient denies going to group sessions because she is \"not interested\" and is \"too tired\". She did report she has been socializing with other patients. Her goals of treatment are \"to get better\".     Appetite:   [x] Normal/Unchanged  [] Increased  [] Decreased      Sleep:       [] Normal/Unchanged  [x] Fair       [] Poor              Energy:    [] Normal/Unchanged  [] Increased  [x] Decreased        SI [] Present  [x] Absent    HI  []Present  [x] Absent     Aggression:  [] yes  [x] no    Patient is [x] able  [] unable to CONTRACT FOR SAFETY     PAST MEDICAL/PSYCHIATRIC HISTORY:   Past Medical History:   Diagnosis Date    Cancer ([facility] [address]     breast     Depression     Fibromyalgia     Hypertension        FAMILY/SOCIAL HISTORY:  Family History   Problem Relation Age of Onset    Cancer Father      Social History     Socioeconomic History    Marital status:      Spouse name: Not on file    Number of children: Not on file    Years of education: Not on file    Highest education level: Not on file   Occupational History    Not on file   Tobacco Use    Smoking status: Never Smoker    Smokeless tobacco: Never Used   Vaping Use    Vaping Use: Never used   Substance and Sexual Activity    Alcohol use: Not Currently     Comment: socially    Drug use: Not Currently    Sexual activity: Not on file   Other Topics Concern    Not on file   Social History Narrative    Not on file     Social Determinants of Health     Financial Resource Strain: Low Risk     Difficulty of Paying Living Expenses: Not hard at all   Food Insecurity: No Food Insecurity    Worried About Running Out of Food in the Last Year: Never true    [address] in the Last Year: Never true   Transportation Needs:     Lack of Transportation (Medical): Not on file    Lack of Transportation (Non-Medical): Not on file   Physical Activity:     Days of Exercise per Week: Not on file    Minutes of Exercise per Session: Not on file   Stress:     Feeling of Stress : Not on file   Social Connections:     Frequency of Communication with Friends and Family: Not on file    Frequency of Social Gatherings with Friends and Family: Not on file    Attends Church Services: Not on file    Active Member of [address] [place] [address] [employer] or Organizations: Not on file    Attends Club or Organization Meetings: Not on file    Marital Status: Not on file   Intimate Partner Violence:     Fear of Current or Ex-Partner: Not on file    Emotionally Abused: Not on file    Physically Abused: Not on file    Sexually Abused: Not on file   Housing Stability:     Unable to Pay for Housing in the Last Year: Not on file    Number of [place] in the Last Year: Not on file    Unstable Housing in the Last Year: Not on file       ROS:  [x] All negative/unchanged except if checked.  Explain positive(checked items) below:  [] Constitutional  [] Eyes  [] Ear/Nose/Mouth/Throat  [] Respiratory  [] CV  [] GI  []   [] Musculoskeletal  [] Skin/Breast  [] Neurological  [] Endocrine  [] Heme/Lymph  [] Allergic/Immunologic    Explanation:     MEDICATIONS:    Current Facility-Administered Medications:     tamoxifen (NOLVADEX) tablet 20 mg, 20 mg, Oral, Daily, Juvenal Valerio MD, 20 mg at 05/26/22 0913    OXcarbazepine (TRILEPTAL) tablet 150 mg, 150 mg, Oral, BID, LETY Marmolejo CNP, 150 mg at 05/26/22 2037    citalopram (CELEXA) tablet 10 mg, 10 mg, Oral, Daily, LETY Hahn - CNP, 10 mg at 05/26/22 0908    acetaminophen (TYLENOL) tablet 650 mg, 650 mg, Oral, Q6H PRN, Dexter Salinas MD    magnesium hydroxide (MILK OF MAGNESIA) 400 MG/5ML suspension 30 mL, 30 mL, Oral, Daily PRN, Dexter Salinas MD    aluminum & magnesium hydroxide-simethicone (MAALOX) 200-200-20 MG/5ML suspension 30 mL, 30 mL, Oral, PRN, Dexter Salinas MD    hydrOXYzine (VISTARIL) capsule 50 mg, 50 mg, Oral, TID PRN, Dexter Salinas MD    haloperidol (HALDOL) tablet 5 mg, 5 mg, Oral, Q6H PRN **OR** haloperidol lactate (HALDOL) injection 5 mg, 5 mg, IntraMUSCular, Q6H PRN, Dexter Salinas MD    melatonin tablet 3 mg, 3 mg, Oral, Nightly, Juvenal Valerio MD, 3 mg at 05/26/22 2037    chlordiazePOXIDE (LIBRIUM) capsule 25 mg, 25 mg, Oral, Q4H, Dexter Salinas MD, 25 mg at 05/26/22 2037      Examination:  BP (!) 99/58   Pulse 84   Temp 99 °F (37.2 °C) (Oral)   Resp 18   LMP  (LMP Unknown)   SpO2 96%   Gait - steady  Medication side effects(SE): denies    Mental Status Examination:    Level of consciousness:  within normal limits   Appearance:  poor grooming and poor hygiene  Behavior/Motor:  psychomotor retardation  Attitude toward examiner:  cooperative  Speech:  normal volume, well articulated and slow   Mood: depressed  Affect:  mood congruent and flat  Thought processes:  linear   Thought content: Thought contents are devoid of any AVH, delusions, or any other perceptual abnormalities. Patient does not seem to be internally stimulated, paranoid, or suspicious. Patient denies thought broadcasting, ideas of reference, and inception. Patient denies SI and HI.   Cognition:  oriented to person, place, and time   Concentration intact  Insight poor  Judgement por     ASSESSMENT:   Patient symptoms are:  [] Well controlled  [] Improving  [] Worsening  [x] No change      Diagnosis:   Principal Problem:    Bipolar 2 disorder, major depressive episode ([facility] [address]  Active Problems:    Cluster B personality traits ([facility] [address]  Resolved Problems:    * No resolved hospital problems. *      LABS:    Recent Labs     05/24/22  1216   WBC 7.2   HGB 12.0        Recent Labs     05/24/22  1216      K 4.4      CO2 24   BUN 11   CREATININE 0.8   GLUCOSE 114*     Recent Labs     05/24/22  1216   BILITOT 0.3   ALKPHOS 67   AST 19   ALT 13     Lab Results   Component Value Date    LABAMPH NOT DETECTED 05/24/2022    BARBSCNU NOT DETECTED 05/24/2022    LABBENZ POSITIVE 05/24/2022    LABMETH NOT DETECTED 05/24/2022    OPIATESCREENURINE NOT DETECTED 05/24/2022    PHENCYCLIDINESCREENURINE NOT DETECTED 05/24/2022    ETOH <10 05/24/2022     Lab Results   Component Value Date    TSH 2.500 09/15/2021     No results found for: LITHIUM  No results found for: VALPROATE, CBMZ      Treatment Plan:  The patient's diagnosis, treatment plan, medication management were formulated after patient was seen directly by the attending physician and myself and all relevant documentation was reviewed.     Risk, benefit, side effects, possible outcomes of the medication and alternatives discussed with the patient and the patient demonstrated understanding.  The patient was also educated that the outcome of treatment will depend on the medication compliance as directed by the prescribers along with regular follow-up, compliance with the labs and other work-up, as clinically indicated.     Celexa 10 mg daily for depression anxiety and impulsivity related to cluster B personality traits  Trileptal 150 mg twice daily we will consider optimizing this medication if clinically indicated for mood stability     Collateral information: Followed by social work  CD evaluation  Encourage patient to attend group and other milieu activities.   Discharge planning discussed with the patient and treatment team.    PSYCHOTHERAPY/COUNSELING:  [x] Therapeutic interview  [x] Supportive  [] CBT  [] Ongoing  [] Other    [x] Patient continues to need, on a daily basis, active treatment furnished directly by or requiring the supervision of inpatient psychiatric personnel      Anticipated Length of stay: 5-7 days based on stability       Electronically signed by LETY Snell CNP on 5/27/2022 at 8:18 AM

## 2022-05-28 PROCEDURE — 6370000000 HC RX 637 (ALT 250 FOR IP): Performed by: PSYCHIATRY & NEUROLOGY

## 2022-05-28 PROCEDURE — 6370000000 HC RX 637 (ALT 250 FOR IP): Performed by: NURSE PRACTITIONER

## 2022-05-28 PROCEDURE — 1240000000 HC EMOTIONAL WELLNESS R&B

## 2022-05-28 RX ORDER — CHLORDIAZEPOXIDE HYDROCHLORIDE 25 MG/1
25 CAPSULE, GELATIN COATED ORAL 3 TIMES DAILY PRN
Status: DISCONTINUED | OUTPATIENT
Start: 2022-05-28 | End: 2022-05-31 | Stop reason: HOSPADM

## 2022-05-28 RX ADMIN — CITALOPRAM HYDROBROMIDE 10 MG: 20 TABLET ORAL at 09:42

## 2022-05-28 RX ADMIN — TAMOXIFEN CITRATE 20 MG: 10 TABLET, FILM COATED ORAL at 09:41

## 2022-05-28 RX ADMIN — OXCARBAZEPINE 150 MG: 150 TABLET, FILM COATED ORAL at 09:41

## 2022-05-28 RX ADMIN — Medication 3 MG: at 21:29

## 2022-05-28 RX ADMIN — HYDROXYZINE PAMOATE 50 MG: 50 CAPSULE ORAL at 21:29

## 2022-05-28 RX ADMIN — OXCARBAZEPINE 150 MG: 150 TABLET, FILM COATED ORAL at 21:29

## 2022-05-28 RX ADMIN — CHLORDIAZEPOXIDE HYDROCHLORIDE 25 MG: 25 CAPSULE ORAL at 08:00

## 2022-05-28 ASSESSMENT — PAIN SCALES - GENERAL
PAINLEVEL_OUTOF10: 0
PAINLEVEL_OUTOF10: 0

## 2022-05-28 NOTE — BH NOTE
Pt denies SI HI and hallucinations. Pt flat. Pt is upset that family has her phone and are not willing to provide her with phone numbers in her phone. Pt dtr [employer] stated it is best she does not have those #s. Pt dtr also inquired about pt medications. POLA was signed by pt for [employer] and information provided to her. Pt is out on the unit social with [facility]. No aggression or behaviors. Pt stated she does not wish to be on Ativan or Valium at discharge and \"feels ok, just upset over family situation at this point\". Pt encouraged to attend groups. Pt is medication compliant. Mood is stable. Continues to minimize and have poor insight and judgment (when talking with family). [facility] reported that pt stated she was going to kill herself if they did not give her the phone numbers she is requesting when she is discharged. When pt was asked about this, pt stated \"I have to right to have access to my phone when I am discharged. I am an adult and they don't have the right to keep my things from me\". Pt is eating provided meals. [facility] groups. We will continue to provide support and comfort for the patient.

## 2022-05-28 NOTE — PROGRESS NOTES
Patient denies suicidal ideation, homicidal ideations and AVH. Presents calm and cooperative during assessment. Patient is isolating to her room this evening. Refused Librium this evening other medications taken without issue. No complaints or concerns verbalized at this time. No unit problems reported. Will continue to observe and support.
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
## 2022-05-28 NOTE — PLAN OF CARE
Problem: Anxiety  Goal: Will report anxiety at manageable levels  Description: INTERVENTIONS:  1. Administer medication as ordered  2. Teach and rehearse alternative coping skills  3. Provide emotional support with 1:1 interaction with staff  5/28/2022 1352 by Pernell Hernandez RN  Outcome: Progressing     Problem: Safety - Adult  Goal: Free from fall injury  5/28/2022 1352 by Pernell Hernandez RN  Outcome: Adequate for Discharge     Problem: Self Harm/Suicidality  Goal: Will have no self-injury during hospital stay  Description: INTERVENTIONS:  1. Q 30 MINUTES: Routine safety checks  2. Q SHIFT & PRN: Assess risk to determine if routine checks are adequate to maintain patient safety  5/28/2022 1352 by Pernell Hernandez RN  Outcome: Adequate for Discharge     Problem: Involuntary Admit  Goal: Will cooperate with staff recommendations and doctor's orders and will demonstrate appropriate behavior  Description: INTERVENTIONS:  1. Treat underlying conditions and offer medication as ordered  2. Educate regarding involuntary admission procedures and rules  3.  Contain excessive/inappropriate behavior per unit and hospital policies  [id number] by Pernell Hernandez RN  Outcome: Adequate for Discharge
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
## 2022-05-29 PROCEDURE — 1240000000 HC EMOTIONAL WELLNESS R&B

## 2022-05-29 PROCEDURE — 99231 SBSQ HOSP IP/OBS SF/LOW 25: CPT | Performed by: NURSE PRACTITIONER

## 2022-05-29 PROCEDURE — 6370000000 HC RX 637 (ALT 250 FOR IP): Performed by: NURSE PRACTITIONER

## 2022-05-29 PROCEDURE — 6370000000 HC RX 637 (ALT 250 FOR IP): Performed by: PSYCHIATRY & NEUROLOGY

## 2022-05-29 RX ADMIN — OXCARBAZEPINE 150 MG: 150 TABLET, FILM COATED ORAL at 22:24

## 2022-05-29 RX ADMIN — OXCARBAZEPINE 150 MG: 150 TABLET, FILM COATED ORAL at 09:30

## 2022-05-29 RX ADMIN — CITALOPRAM HYDROBROMIDE 10 MG: 20 TABLET ORAL at 09:29

## 2022-05-29 RX ADMIN — Medication 3 MG: at 22:24

## 2022-05-29 RX ADMIN — TAMOXIFEN CITRATE 20 MG: 10 TABLET, FILM COATED ORAL at 09:29

## 2022-05-29 ASSESSMENT — PAIN SCALES - GENERAL
PAINLEVEL_OUTOF10: 0
PAINLEVEL_OUTOF10: 0

## 2022-05-29 NOTE — PROGRESS NOTES
BEHAVIORAL HEALTH FOLLOW-UP NOTE     5/29/2022     Patient was seen and examined in person, Chart reviewed   Patient's case discussed with staff/team      Chief Complaint: \"I'm starting to feel better\"     Interim History:   Patient seen in her room today, while lying in bed. She was slow to respond and responses were very short, but was cooperative. Patient reports she feels \"okay\" but is very tired and thinks she is sleeping too much. She states that overall her thoughts are more clear and she feels better. She reports her medications might be helping her mood a little bit. She continues to feel depressed but states this is improving. Denies SI, HI, or AVH. Patient is  going to group sessions. She did report she has been socializing with other patients. Her goals of treatment are \"to get better\".     Appetite:   [x] Normal/Unchanged  [] Increased  [] Decreased      Sleep:       [] Normal/Unchanged  [x] Fair       [] Poor              Energy:    [] Normal/Unchanged  [] Increased  [x] Decreased        SI [] Present  [x] Absent    HI  []Present  [x] Absent     Aggression:  [] yes  [x] no    Patient is [x] able  [] unable to CONTRACT FOR SAFETY     PAST MEDICAL/PSYCHIATRIC HISTORY:   Past Medical History:   Diagnosis Date    Cancer ([facility] [address]     breast     Depression     Fibromyalgia     Hypertension        FAMILY/SOCIAL HISTORY:  Family History   Problem Relation Age of Onset    Cancer Father      Social History     Socioeconomic History    Marital status:      Spouse name: Not on file    Number of children: Not on file    Years of education: Not on file    Highest education level: Not on file   Occupational History    Not on file   Tobacco Use    Smoking status: Never Smoker    Smokeless tobacco: Never Used   Vaping Use    Vaping Use: Never used   Substance and Sexual Activity    Alcohol use: Not Currently     Comment: socially    Drug use: Not Currently    Sexual activity: Not on file   Other Topics Concern    Not on file   Social History Narrative    Not on file     Social Determinants of Health     Financial Resource Strain: Low Risk     Difficulty of Paying Living Expenses: Not hard at all   Food Insecurity: No Food Insecurity    Worried About Running Out of Food in the Last Year: Never true    [address] in the Last Year: Never true   Transportation Needs:     Lack of Transportation (Medical): Not on file    Lack of Transportation (Non-Medical): Not on file   Physical Activity:     Days of Exercise per Week: Not on file    Minutes of Exercise per Session: Not on file   Stress:     Feeling of Stress : Not on file   Social Connections:     Frequency of Communication with Friends and Family: Not on file    Frequency of Social Gatherings with Friends and Family: Not on file    Attends [employer]ism Services: Not on file    Active Member of [address] [place] [address] [employer] or Organizations: Not on file    Attends Club or Organization Meetings: Not on file    Marital Status: Not on file   Intimate Partner Violence:     Fear of Current or Ex-Partner: Not on file    Emotionally Abused: Not on file    Physically Abused: Not on file    Sexually Abused: Not on file   Housing Stability:     Unable to Pay for Housing in the Last Year: Not on file    Number of [place] in the Last Year: Not on file    Unstable Housing in the Last Year: Not on file       ROS:  [x] All negative/unchanged except if checked.  Explain positive(checked items) below:  [] Constitutional  [] Eyes  [] Ear/Nose/Mouth/Throat  [] Respiratory  [] CV  [] GI  []   [] Musculoskeletal  [] Skin/Breast  [] Neurological  [] Endocrine  [] Heme/Lymph  [] Allergic/Immunologic    Explanation:     MEDICATIONS:    Current Facility-Administered Medications:     chlordiazePOXIDE (LIBRIUM) capsule 25 mg, 25 mg, Oral, TID PRN, Natalia Rucker APRN - CNP, 25 mg at 05/28/22 0800    tamoxifen (NOLVADEX) tablet 20 mg, 20 mg, Oral, Daily, Manpreet De La Cruz MD, 20 mg at 05/29/22 0929    OXcarbazepine (TRILEPTAL) tablet 150 mg, 150 mg, Oral, BID, ChristyLETY Hubbard - CNP, 150 mg at 05/29/22 0930    citalopram (CELEXA) tablet 10 mg, 10 mg, Oral, Daily, Christy AlfredLETY freire - CNP, 10 mg at 05/29/22 9172    acetaminophen (TYLENOL) tablet 650 mg, 650 mg, Oral, Q6H PRN, Kong Pierson MD    magnesium hydroxide (MILK OF MAGNESIA) 400 MG/5ML suspension 30 mL, 30 mL, Oral, Daily PRN, Kong Pierson MD    aluminum & magnesium hydroxide-simethicone (MAALOX) 200-200-20 MG/5ML suspension 30 mL, 30 mL, Oral, PRN, Kong Pierson MD    hydrOXYzine (VISTARIL) capsule 50 mg, 50 mg, Oral, TID PRN, Kong Pierson MD, 50 mg at 05/28/22 2129    haloperidol (HALDOL) tablet 5 mg, 5 mg, Oral, Q6H PRN **OR** haloperidol lactate (HALDOL) injection 5 mg, 5 mg, IntraMUSCular, Q6H PRN, Kong Pierson MD    melatonin tablet 3 mg, 3 mg, Oral, Nightly, Kong Pierson MD, 3 mg at 05/28/22 2129      Examination:  /62   Pulse 81   Temp 97.3 °F (36.3 °C) (Temporal)   Resp 14   LMP  (LMP Unknown)   SpO2 98%   Gait - steady  Medication side effects(SE): denies    Mental Status Examination:    Level of consciousness:  within normal limits   Appearance:  poor grooming and poor hygiene  Behavior/Motor:  psychomotor retardation  Attitude toward examiner:  cooperative  Speech:  normal volume, well articulated and slow   Mood: depressed  Affect:  mood congruent and flat  Thought processes:  linear   Thought content: Thought contents are devoid of any AVH, delusions, or any other perceptual abnormalities. Patient does not seem to be internally stimulated, paranoid, or suspicious. Patient denies thought broadcasting, ideas of reference, and inception. Patient denies SI and HI.   Cognition:  oriented to person, place, and time   Concentration intact  Insight poor  Judgement por     ASSESSMENT:   Patient symptoms are:  [] Well controlled  [] Improving  [] Worsening  [x] No change      Diagnosis: Principal Problem:    Bipolar 2 disorder, major depressive episode ([facility] [address]  Active Problems:    Cluster B personality traits ([facility] [address]  Resolved Problems:    * No resolved hospital problems. *      LABS:    No results for input(s): WBC, HGB, PLT in the last 72 hours. No results for input(s): NA, K, CL, CO2, BUN, CREATININE, GLUCOSE in the last 72 hours. No results for input(s): BILITOT, ALKPHOS, AST, ALT in the last 72 hours. Lab Results   Component Value Date    LABAMPH NOT DETECTED 05/24/2022    BARBSCNU NOT DETECTED 05/24/2022    LABBENZ POSITIVE 05/24/2022    LABMETH NOT DETECTED 05/24/2022    OPIATESCREENURINE NOT DETECTED 05/24/2022    PHENCYCLIDINESCREENURINE NOT DETECTED 05/24/2022    ETOH <10 05/24/2022     Lab Results   Component Value Date    TSH 2.500 09/15/2021     No results found for: LITHIUM  No results found for: VALPROATE, CBMZ      Treatment Plan:  The patient's diagnosis, treatment plan, medication management were formulated after patient was seen directly by the attending physician and myself and all relevant documentation was reviewed.     Risk, benefit, side effects, possible outcomes of the medication and alternatives discussed with the patient and the patient demonstrated understanding.  The patient was also educated that the outcome of treatment will depend on the medication compliance as directed by the prescribers along with regular follow-up, compliance with the labs and other work-up, as clinically indicated.     Celexa 10 mg daily for depression anxiety and impulsivity related to cluster B personality traits  Trileptal 150 mg twice daily we will consider optimizing this medication if clinically indicated for mood stability     Collateral information: Followed by social work  CD evaluation  Encourage patient to attend group and other milieu activities.   Discharge planning discussed with the patient and treatment team.    PSYCHOTHERAPY/COUNSELING:  [x] Therapeutic interview  [x] Supportive  [] CBT  [] Ongoing  [] Other    [x] Patient continues to need, on a daily basis, active treatment furnished directly by or requiring the supervision of inpatient psychiatric personnel      Anticipated Length of stay: 5-7 days based on stability       Electronically signed by LETY Araya CNP on 5/29/2022 at 1:15 PM

## 2022-05-29 NOTE — BH NOTE
Pt denies SI HI and hallucinations. Pt is flat, blunt, mostly isolative to her room. Social with [facility] when out on the unit. No aggression or behaviors. Pt rated depression 8/10. Does not endorse anxiety. Pt is medication compliant. Pt is eating provided meals. Pt attends [facility] groups. No voiced or observed paranoia/delusions. We will continue to provide support and comfort for the patient.

## 2022-05-29 NOTE — CARE COORDINATION
Denys received call from Brianne Santiago. Brianne Santiago reports that she wants to give pt the phone number to pt's  because pt said that she feels more comfortable speaking to her  and Brianne Santiago feels that this would help pt to open up and talk about her feelings more. Brianne Santiago would like sw to inform pt of this and to have pt call her to obtain the phone number. Brianne Santiago also reports that pt would benefit from more one-on-one time with sw. Denys informed Brianne Santiago that sw will make a supportive visit to pt. Denys met with pt and informed her or Bozena's phone call. Pt states \"yeah, right, she won't give me the number\". Sw offered pt empathy and support, asked if pt wanted to talk to sw about anything. Pt denied at this time.  Sw to continue to assist.

## 2022-05-29 NOTE — PLAN OF CARE
Problem: Self Harm/Suicidality  Goal: Will have no self-injury during hospital stay  Description: INTERVENTIONS:  1. Q 30 MINUTES: Routine safety checks  2. Q SHIFT & PRN: Assess risk to determine if routine checks are adequate to maintain patient safety  Outcome: Progressing     Problem: Anxiety  Goal: Will report anxiety at manageable levels  Description: INTERVENTIONS:  1. Administer medication as ordered  2. Teach and rehearse alternative coping skills  3. Provide emotional support with 1:1 interaction with staff  Outcome: Progressing     Problem: Involuntary Admit  Goal: Will cooperate with staff recommendations and doctor's orders and will demonstrate appropriate behavior  Description: INTERVENTIONS:  1. Treat underlying conditions and offer medication as ordered  2. Educate regarding involuntary admission procedures and rules  3.  Contain excessive/inappropriate behavior per unit and hospital policies  Outcome: Progressing     Problem: Pain  Goal: Verbalizes/displays adequate comfort level or baseline comfort level  Outcome: Progressing

## 2022-05-29 NOTE — PLAN OF CARE
Patient up and about the unit watching TV and talking with  on the phone. Patient is flat but pleasant denies SI/HI AVH and rates depression and anxiety 10/10. Will continue to monitor and assess q 15 min for safety throughout the shift. Problem: Safety - Adult  Goal: Free from fall injury  Outcome: Progressing     Problem: Self Harm/Suicidality  Goal: Will have no self-injury during hospital stay  Description: INTERVENTIONS:  1. Q 30 MINUTES: Routine safety checks  2. Q SHIFT & PRN: Assess risk to determine if routine checks are adequate to maintain patient safety  Outcome: Progressing     Problem: Depression  Goal: Will be euthymic at discharge  Description: INTERVENTIONS:  1. Administer medication as ordered  2. Provide emotional support via 1:1 interaction with staff  3. Encourage involvement in milieu/groups/activities  4. Monitor for social isolation  Outcome: Progressing     Problem: Anxiety  Goal: Will report anxiety at manageable levels  Description: INTERVENTIONS:  1. Administer medication as ordered  2. Teach and rehearse alternative coping skills  3. Provide emotional support with 1:1 interaction with staff  Outcome: Bairon Breaux (Taken 5/26/2022 0800 by Shreya Moreno, RN)  Will report anxiety at manageable levels:   Administer medication as ordered   Provide emotional support with 1:1 interaction with staff   Teach and rehearse alternative coping skills     Problem: Involuntary Admit  Goal: Will cooperate with staff recommendations and doctor's orders and will demonstrate appropriate behavior  Description: INTERVENTIONS:  1. Treat underlying conditions and offer medication as ordered  2. Educate regarding involuntary admission procedures and rules  3.  Contain excessive/inappropriate behavior per unit and hospital policies  Outcome: Progressing  Flowsheets (Taken 5/26/2022 0800 by Shreya Moreno RN)  Will cooperate with staff recommendations and doctor's orders and will demonstrate appropriate behavior:   Treat underlying conditions and offer medication as ordered   Contain excessive/inappropriate behavior per unit and hospital policies     Problem: Pain  Goal: Verbalizes/displays adequate comfort level or baseline comfort level  Outcome: Progressing

## 2022-05-29 NOTE — GROUP NOTE
Group Therapy Note    Date: 5/29/2022    Group Start Time: 1400  Group End Time: 1430  Group Topic: Cognitive Skills    [facility] 2    DEBRA Newton        Group Therapy Note    Attendees: 9         Patient's Goal: Pt will be able to identify different cognitive distortions and learn how to challenge negative thoughts. Notes:  Pt was an active participant in group discussion. Status After Intervention:  Improved    Participation Level: Active Listener, Interactive and Minimal    Participation Quality: Appropriate, Attentive, Sharing and Supportive      Speech:  normal      Thought Process/Content: Logical  Linear      Affective Functioning: Congruent      Mood: anxious and depressed      Level of consciousness:  Alert, Oriented x4 and Attentive      Response to Learning: Able to verbalize current knowledge/experience, Able to verbalize/acknowledge new learning, Able to retain information, Capable of insight and Progressing to goal      Endings: None Reported    Modes of Intervention: Education, Support, Socialization, Exploration, Clarifying and Problem-solving      Discipline Responsible: /Counselor      Signature:   [facility]

## 2022-05-29 NOTE — CARE COORDINATION
Sw received call from pt daughter Francine. Francine reports that pt called this morning and was upset because she is being moved to another unit. Sw explained to [employer] that pt is being transferred to another unit with younger people, explained that pt is currently on a geriatric psychiatric unit. Francine verbalized understanding, reported no concerns with this transfer. Francine then asked sw for an update on pt status. Sw gave [employer] update on pt.

## 2022-05-30 PROCEDURE — 6370000000 HC RX 637 (ALT 250 FOR IP): Performed by: NURSE PRACTITIONER

## 2022-05-30 PROCEDURE — 1240000000 HC EMOTIONAL WELLNESS R&B

## 2022-05-30 PROCEDURE — 6370000000 HC RX 637 (ALT 250 FOR IP): Performed by: PSYCHIATRY & NEUROLOGY

## 2022-05-30 PROCEDURE — 99231 SBSQ HOSP IP/OBS SF/LOW 25: CPT | Performed by: NURSE PRACTITIONER

## 2022-05-30 RX ADMIN — TAMOXIFEN CITRATE 20 MG: 10 TABLET, FILM COATED ORAL at 10:34

## 2022-05-30 RX ADMIN — Medication 3 MG: at 22:03

## 2022-05-30 RX ADMIN — OXCARBAZEPINE 150 MG: 150 TABLET, FILM COATED ORAL at 10:34

## 2022-05-30 RX ADMIN — OXCARBAZEPINE 150 MG: 150 TABLET, FILM COATED ORAL at 22:03

## 2022-05-30 RX ADMIN — CITALOPRAM HYDROBROMIDE 10 MG: 20 TABLET ORAL at 10:34

## 2022-05-30 ASSESSMENT — PAIN SCALES - GENERAL
PAINLEVEL_OUTOF10: 0
PAINLEVEL_OUTOF10: 0

## 2022-05-30 NOTE — GROUP NOTE
Group Therapy Note    Date: 5/30/2022    Group Start Time: 2835  Group End Time: 4259  Group Topic: Psychoeducation    [facility] ACUTE  1    MATILDE Karimi LSW        Group Therapy Note    Attendees: 14         Patient's Goal:  Pt will be able to verbalize the importance of self-care. Notes:  Pt made connections and participated in group. Status After Intervention:  Improved    Participation Level:  Active Listener and Interactive    Participation Quality: Appropriate, Attentive, Sharing and Supportive      Speech:  normal      Thought Process/Content: Logical  Linear      Affective Functioning: Congruent      Mood: depressed      Level of consciousness:  Alert, Oriented x4 and Attentive      Response to Learning: Able to verbalize current knowledge/experience      Endings: None Reported    Modes of Intervention: Education, Support, Socialization and Exploration      Discipline Responsible: /Counselor      Signature:  MATILDE Karimi LSW

## 2022-05-30 NOTE — PLAN OF CARE
Problem: Safety - Adult  Goal: Free from fall injury  Outcome: Progressing     Problem: Self Harm/Suicidality  Goal: Will have no self-injury during hospital stay  Description: INTERVENTIONS:  1. Q 30 MINUTES: Routine safety checks  2. Q SHIFT & PRN: Assess risk to determine if routine checks are adequate to maintain patient safety  Outcome: Progressing     Problem: Anxiety  Goal: Will report anxiety at manageable levels  Description: INTERVENTIONS:  1. Administer medication as ordered  2. Teach and rehearse alternative coping skills  3. Provide emotional support with 1:1 interaction with staff  Outcome: Progressing     Problem: Involuntary Admit  Goal: Will cooperate with staff recommendations and doctor's orders and will demonstrate appropriate behavior  Description: INTERVENTIONS:  1. Treat underlying conditions and offer medication as ordered  2. Educate regarding involuntary admission procedures and rules  3.  Contain excessive/inappropriate behavior per unit and hospital policies  Outcome: Progressing     Problem: Pain  Goal: Verbalizes/displays adequate comfort level or baseline comfort level  Outcome: Progressing

## 2022-05-30 NOTE — PLAN OF CARE
Patient up and about the unit watching TV states she is feeling much better and is hoping to get to go home in am. Patient is flat but pleasant denies SI/HI AVH and denies depression and anxiety. Will continue to monitor and assess q 15 min for safety throughout the shift. Problem: Safety - Adult  Goal: Free from fall injury  Outcome: Progressing     Problem: Self Harm/Suicidality  Goal: Will have no self-injury during hospital stay  Description: INTERVENTIONS:  1. Q 30 MINUTES: Routine safety checks  2. Q SHIFT & PRN: Assess risk to determine if routine checks are adequate to maintain patient safety  Outcome: Progressing     Problem: Depression  Goal: Will be euthymic at discharge  Description: INTERVENTIONS:  1. Administer medication as ordered  2. Provide emotional support via 1:1 interaction with staff  3. Encourage involvement in milieu/groups/activities  4. Monitor for social isolation  Outcome: Progressing     Problem: Anxiety  Goal: Will report anxiety at manageable levels  Description: INTERVENTIONS:  1. Administer medication as ordered  2. Teach and rehearse alternative coping skills  3. Provide emotional support with 1:1 interaction with staff  Outcome: Alexis Philip (Taken 5/26/2022 0800 by Janice Cali RN)  Will report anxiety at manageable levels:   Administer medication as ordered   Provide emotional support with 1:1 interaction with staff   Teach and rehearse alternative coping skills     Problem: Involuntary Admit  Goal: Will cooperate with staff recommendations and doctor's orders and will demonstrate appropriate behavior  Description: INTERVENTIONS:  1. Treat underlying conditions and offer medication as ordered  2. Educate regarding involuntary admission procedures and rules  3.  Contain excessive/inappropriate behavior per unit and hospital policies  Outcome: Progressing  Flowsheets (Taken 5/26/2022 0800 by Janice Cali RN)  Will cooperate with staff recommendations and doctor's orders and will demonstrate appropriate behavior:   Treat underlying conditions and offer medication as ordered   Contain excessive/inappropriate behavior per unit and hospital policies     Problem: Pain  Goal: Verbalizes/displays adequate comfort level or baseline comfort level  Outcome: Progressing

## 2022-05-30 NOTE — PROGRESS NOTES
BEHAVIORAL HEALTH FOLLOW-UP NOTE     5/30/2022     Patient was seen and examined in person, Chart reviewed   Patient's case discussed with staff/team      Chief Complaint: \"I'm a lot better\"    Interim History:   Patient seen in the day room today. Patient reports she feels a lot better. \"Yesterday I started to really feel like myself again\". She states that overall her thoughts are more clear and she feels better. She reports her medications are helping. Denies SI, HI, or AVH. Patient is  going to group sessions. She did report she has been socializing with other patients. She is able to tell me her goals with richness of detail and plans to return home with family. Her goals of treatment are \"to get better\".     Appetite:   [x] Normal/Unchanged  [] Increased  [] Decreased      Sleep:       [] Normal/Unchanged  [x] Fair       [] Poor              Energy:    [x] Normal/Unchanged  [] Increased  [] Decreased        SI [] Present  [x] Absent    HI  []Present  [x] Absent     Aggression:  [] yes  [x] no    Patient is [x] able  [] unable to CONTRACT FOR SAFETY     PAST MEDICAL/PSYCHIATRIC HISTORY:   Past Medical History:   Diagnosis Date    Cancer ([facility] [address]     breast     Depression     Fibromyalgia     Hypertension        FAMILY/SOCIAL HISTORY:  Family History   Problem Relation Age of Onset    Cancer Father      Social History     Socioeconomic History    Marital status:      Spouse name: Not on file    Number of children: Not on file    Years of education: Not on file    Highest education level: Not on file   Occupational History    Not on file   Tobacco Use    Smoking status: Never Smoker    Smokeless tobacco: Never Used   Vaping Use    Vaping Use: Never used   Substance and Sexual Activity    Alcohol use: Not Currently     Comment: socially    Drug use: Not Currently    Sexual activity: Not on file   Other Topics Concern    Not on file   Social History Narrative    Not on file     Social Determinants of Health     Financial Resource Strain: Low Risk     Difficulty of Paying Living Expenses: Not hard at all   Food Insecurity: No Food Insecurity    Worried About Running Out of Food in the Last Year: Never true    Bg of Food in the Last Year: Never true   Transportation Needs:     Lack of Transportation (Medical): Not on file    Lack of Transportation (Non-Medical): Not on file   Physical Activity:     Days of Exercise per Week: Not on file    Minutes of Exercise per Session: Not on file   Stress:     Feeling of Stress : Not on file   Social Connections:     Frequency of Communication with Friends and Family: Not on file    Frequency of Social Gatherings with Friends and Family: Not on file    Attends Islam Services: Not on file    Active Member of [address] [place] [address] [email or web address] or Organizations: Not on file    Attends Club or Organization Meetings: Not on file    Marital Status: Not on file   Intimate Partner Violence:     Fear of Current or Ex-Partner: Not on file    Emotionally Abused: Not on file    Physically Abused: Not on file    Sexually Abused: Not on file   Housing Stability:     Unable to Pay for Housing in the Last Year: Not on file    Number of [place] in the Last Year: Not on file    Unstable Housing in the Last Year: Not on file       ROS:  [x] All negative/unchanged except if checked.  Explain positive(checked items) below:  [] Constitutional  [] Eyes  [] Ear/Nose/Mouth/Throat  [] Respiratory  [] CV  [] GI  []   [] Musculoskeletal  [] Skin/Breast  [] Neurological  [] Endocrine  [] Heme/Lymph  [] Allergic/Immunologic    Explanation:     MEDICATIONS:    Current Facility-Administered Medications:     chlordiazePOXIDE (LIBRIUM) capsule 25 mg, 25 mg, Oral, TID PRN, LETY Jeffries CNP, 25 mg at 05/28/22 0800    tamoxifen (NOLVADEX) tablet 20 mg, 20 mg, Oral, Daily, Joseph Pompa MD, 20 mg at 05/29/22 0929    OXcarbazepine (TRILEPTAL) tablet 150 mg, 150 mg, Oral, BID, LETY Lebron CNP, 150 mg at 05/29/22 2224    citalopram (CELEXA) tablet 10 mg, 10 mg, Oral, Daily, LETY Lebron CNP, 10 mg at 05/29/22 6917    acetaminophen (TYLENOL) tablet 650 mg, 650 mg, Oral, Q6H PRN, Lew Durham MD    magnesium hydroxide (MILK OF MAGNESIA) 400 MG/5ML suspension 30 mL, 30 mL, Oral, Daily PRN, Lew Durham MD    aluminum & magnesium hydroxide-simethicone (MAALOX) 200-200-20 MG/5ML suspension 30 mL, 30 mL, Oral, PRN, Lew Durham MD    hydrOXYzine (VISTARIL) capsule 50 mg, 50 mg, Oral, TID PRN, Lew Durham MD, 50 mg at 05/28/22 2129    haloperidol (HALDOL) tablet 5 mg, 5 mg, Oral, Q6H PRN **OR** haloperidol lactate (HALDOL) injection 5 mg, 5 mg, IntraMUSCular, Q6H PRN, Lew Durham MD    melatonin tablet 3 mg, 3 mg, Oral, Nightly, Lew Durham MD, 3 mg at 05/29/22 2224      Examination:  /86   Pulse (!) 105   Temp 97.6 °F (36.4 °C) (Temporal)   Resp 16   LMP  (LMP Unknown)   SpO2 98%   Gait - steady  Medication side effects(SE): denies    Mental Status Examination:    Level of consciousness:  within normal limits   Appearance:  Average hygiene and grooming   Behavior/Motor:  psychomotor retardation  Attitude toward examiner:  cooperative  Speech:  normal volume, well articulated and slow   Mood: euthymic  Affect:  mood congruent   Thought processes:  linear   Thought content: Thought contents are devoid of any AVH, delusions, or any other perceptual abnormalities. Patient does not seem to be internally stimulated, paranoid, or suspicious. Patient denies thought broadcasting, ideas of reference, and inception. Patient denies SI and HI.   Cognition:  oriented to person, place, and time   Concentration intact  Insight and judgement improving     ASSESSMENT:   Patient symptoms are:  [] Well controlled  [x] Improving  [] Worsening  [] No change      Diagnosis:   Principal Problem:    [facility] 2 [facility]  Active Problems:    Cluster B personality traits ([facility] [address]  Resolved Problems:    * No resolved hospital problems. *      LABS:    No results for input(s): WBC, HGB, PLT in the last 72 hours. No results for input(s): NA, K, CL, CO2, BUN, CREATININE, GLUCOSE in the last 72 hours. No results for input(s): BILITOT, ALKPHOS, AST, ALT in the last 72 hours. Lab Results   Component Value Date    LABAMPH NOT DETECTED 05/24/2022    BARBSCNU NOT DETECTED 05/24/2022    LABBENZ POSITIVE 05/24/2022    LABMETH NOT DETECTED 05/24/2022    OPIATESCREENURINE NOT DETECTED 05/24/2022    PHENCYCLIDINESCREENURINE NOT DETECTED 05/24/2022    ETOH <10 05/24/2022     Lab Results   Component Value Date    TSH 2.500 09/15/2021     No results found for: LITHIUM  No results found for: VALPROATE, CBMZ      Treatment Plan:  The patient's diagnosis, treatment plan, medication management were formulated after patient was seen directly by the attending physician and myself and all relevant documentation was reviewed.     Risk, benefit, side effects, possible outcomes of the medication and alternatives discussed with the patient and the patient demonstrated understanding.  The patient was also educated that the outcome of treatment will depend on the medication compliance as directed by the prescribers along with regular follow-up, compliance with the labs and other work-up, as clinically indicated.     Celexa 10 mg daily for depression anxiety and impulsivity related to cluster B personality traits  Trileptal 150 mg twice daily we will consider optimizing this medication if clinically indicated for mood stability     Collateral information: Followed by social work  CD evaluation  Encourage patient to attend group and other milieu activities.   Discharge planning discussed with the patient and treatment team.    PSYCHOTHERAPY/COUNSELING:  [x] Therapeutic interview  [x] Supportive  [] CBT  [] Ongoing  [] Other    [x] Patient continues to need, on a daily basis, active treatment furnished directly by or requiring the supervision of inpatient psychiatric personnel      Anticipated Length of stay: 5-7 days based on stability       Electronically signed by LETY Jeffries CNP on 5/30/2022 at 8:13 AM

## 2022-05-31 VITALS
DIASTOLIC BLOOD PRESSURE: 67 MMHG | SYSTOLIC BLOOD PRESSURE: 134 MMHG | HEART RATE: 89 BPM | TEMPERATURE: 97.4 F | RESPIRATION RATE: 15 BRPM | OXYGEN SATURATION: 98 %

## 2022-05-31 PROCEDURE — 6370000000 HC RX 637 (ALT 250 FOR IP): Performed by: PSYCHIATRY & NEUROLOGY

## 2022-05-31 PROCEDURE — 6370000000 HC RX 637 (ALT 250 FOR IP): Performed by: NURSE PRACTITIONER

## 2022-05-31 PROCEDURE — 99239 HOSP IP/OBS DSCHRG MGMT >30: CPT | Performed by: NURSE PRACTITIONER

## 2022-05-31 RX ORDER — CITALOPRAM 10 MG/1
10 TABLET ORAL DAILY
Qty: 30 TABLET | Refills: 0 | Status: SHIPPED | OUTPATIENT
Start: 2022-05-31 | End: 2022-07-06 | Stop reason: SDUPTHER

## 2022-05-31 RX ORDER — LANOLIN ALCOHOL/MO/W.PET/CERES
3 CREAM (GRAM) TOPICAL NIGHTLY
Refills: 0 | COMMUNITY
Start: 2022-05-31 | End: 2022-06-08

## 2022-05-31 RX ORDER — HYDROXYZINE PAMOATE 50 MG/1
50 CAPSULE ORAL 3 TIMES DAILY PRN
Qty: 42 CAPSULE | Refills: 0 | Status: SHIPPED | OUTPATIENT
Start: 2022-05-31 | End: 2022-06-08 | Stop reason: SDUPTHER

## 2022-05-31 RX ORDER — OXCARBAZEPINE 150 MG/1
150 TABLET, FILM COATED ORAL 2 TIMES DAILY
Qty: 60 TABLET | Refills: 0 | Status: SHIPPED | OUTPATIENT
Start: 2022-05-31 | End: 2022-07-06

## 2022-05-31 RX ADMIN — OXCARBAZEPINE 150 MG: 150 TABLET, FILM COATED ORAL at 09:47

## 2022-05-31 RX ADMIN — CITALOPRAM HYDROBROMIDE 10 MG: 20 TABLET ORAL at 09:46

## 2022-05-31 RX ADMIN — TAMOXIFEN CITRATE 20 MG: 10 TABLET, FILM COATED ORAL at 09:46

## 2022-05-31 ASSESSMENT — PAIN SCALES - GENERAL: PAINLEVEL_OUTOF10: 0

## 2022-05-31 NOTE — GROUP NOTE
Group Therapy Note    Date: 5/31/2022    Group Start Time: 1100  Group End Time: 1130  Group Topic: Cognitive Skills    [facility] AC[facility] BH 2    DEBRA Newton        Group Therapy Note    Attendees: 10         Patient's Goal: Pt will be able to identify unhealthy coping skills and discuss ways to replace them with healthy coping skills. Notes:  Pt was an active participant in group discussion. Status After Intervention:  Improved    Participation Level: Active Listener and Interactive    Participation Quality: Appropriate, Attentive, Sharing and Supportive      Speech:  normal      Thought Process/Content: Logical  Linear      Affective Functioning: Congruent      Mood: anxious      Level of consciousness:  Alert, Oriented x4 and Attentive      Response to Learning: Able to verbalize current knowledge/experience, Able to verbalize/acknowledge new learning, Able to retain information, Capable of insight and Progressing to goal      Endings: None Reported    Modes of Intervention: Education, Support, Socialization, Exploration, Clarifying and Problem-solving      Discipline Responsible: /Counselor      Signature:   [facility]

## 2022-05-31 NOTE — PLAN OF CARE
Patient up and about the unit watching TV states she is feeling much better and is going to get to go home tomorrow in am. Patient is smiling, pleasant and social with peers. Patient denies SI/HI AVH,depression and anxiety. Will continue to monitor and assess q 15 min for safety throughout the shift. Problem: Safety - Adult  Goal: Free from fall injury  Outcome: Progressing     Problem: Self Harm/Suicidality  Goal: Will have no self-injury during hospital stay  Description: INTERVENTIONS:  1. Q 30 MINUTES: Routine safety checks  2. Q SHIFT & PRN: Assess risk to determine if routine checks are adequate to maintain patient safety  Outcome: Progressing     Problem: Depression  Goal: Will be euthymic at discharge  Description: INTERVENTIONS:  1. Administer medication as ordered  2. Provide emotional support via 1:1 interaction with staff  3. Encourage involvement in milieu/groups/activities  4. Monitor for social isolation  Outcome: Progressing     Problem: Anxiety  Goal: Will report anxiety at manageable levels  Description: INTERVENTIONS:  1. Administer medication as ordered  2. Teach and rehearse alternative coping skills  3. Provide emotional support with 1:1 interaction with staff  Outcome: Amador Moseley (Taken 5/26/2022 0800 by Raynard Felty, RN)  Will report anxiety at manageable levels:   Administer medication as ordered   Provide emotional support with 1:1 interaction with staff   Teach and rehearse alternative coping skills     Problem: Involuntary Admit  Goal: Will cooperate with staff recommendations and doctor's orders and will demonstrate appropriate behavior  Description: INTERVENTIONS:  1. Treat underlying conditions and offer medication as ordered  2. Educate regarding involuntary admission procedures and rules  3.  Contain excessive/inappropriate behavior per unit and hospital policies  Outcome: Progressing  Flowsheets (Taken 5/26/2022 0800 by Raynard Felty, RN)  Will cooperate with staff recommendations and doctor's orders and will demonstrate appropriate behavior:   Treat underlying conditions and offer medication as ordered   Contain excessive/inappropriate behavior per unit and hospital policies     Problem: Pain  Goal: Verbalizes/displays adequate comfort level or baseline comfort level  Outcome: Progressing

## 2022-05-31 NOTE — CARE COORDINATION
SILVIA met with this pt to discuss discharge planning. Pt states that she is feeling good, and is ready to discharge home. Pt states that she wants to discharge home with her sister, however the  informed her that her sister was not allowing her to discharge home with her. Pt became tearful and stated \"I'll just call my daughter. \" Pt is currently denying SI/ HI/ hallucinations/ delusions. Pt appears linear and logical. Pt's eye-contact is good. SW will continue to assist as needed. SILVIA spoke with pt's daughter, Yang Pino, who states that this pt is able to reside with her at discharge. Francine states that she is also able to provide transportation. Francine states that she is concerned due to this pt's recent suicide attempt, and states that the pt is \"down playing it. \" SW offered empathy. SW alerted NP.     In order to ensure appropriate transition and discharge planning is in place, the following documents have been transmitted to [facility], as the new outpatient provider:     The d/c diagnosis was transmitted to the next care provider   The reason for hospitalization was transmitted to the next care provider   The d/c medications (dosage and indication) were transmitted to the next care provider    The continuing care plan was transmitted to the next care provider

## 2022-05-31 NOTE — PROGRESS NOTES
Attended morning community meeting. Updated on staffing and daily expectations. Shared goal for the day as to work on me.

## 2022-05-31 NOTE — PROGRESS NOTES
Patient denies SI/HI/Hallucinations, anxiety or depression. Patient bright and pleasant during conversation. Patient observed out on the unit socializing with peers. Patient eager to discharge home today with her daughter. Patient taking prescribed medications, eating provided meals, and attending groups.

## 2022-05-31 NOTE — DISCHARGE SUMMARY
DISCHARGE SUMMARY      Patient ID:  Luigi Dixon  [id number]  54 y.o.  1970    Admit date: 5/24/2022    Discharge date and time: 5/31/2022    Admitting Physician: Kassidy Edwards MD     Discharge Physician: Dr Ruthie Bass MD    Discharge Diagnoses:   Patient Active Problem List   Diagnosis    Suicide attempt [facility])    Bipolar 2 disorder, major depressive episode ([facility]    Cluster B personality traits ([facility]       Admission Condition: poor    Discharged Condition: stable    Admission Circumstance:   Pt attempted suicide by overdosing on trazodone and ativan, was pink slipped by ED physician       PAST MEDICAL/PSYCHIATRIC HISTORY:   Past Medical History:   Diagnosis Date    Cancer ([facility]     breast     Depression     Fibromyalgia     Hypertension        FAMILY/SOCIAL HISTORY:  Family History   Problem Relation Age of Onset    Cancer Father      Social History     Socioeconomic History    Marital status:      Spouse name: Not on file    Number of children: Not on file    Years of education: Not on file    Highest education level: Not on file   Occupational History    Not on file   Tobacco Use    Smoking status: Never Smoker    Smokeless tobacco: Never Used   Vaping Use    Vaping Use: Never used   Substance and Sexual Activity    Alcohol use: Not Currently     Comment: socially    Drug use: Not Currently    Sexual activity: Not on file   Other Topics Concern    Not on file   Social History Narrative    Not on file     Social Determinants of Health     Financial Resource Strain: Low Risk     Difficulty of Paying Living Expenses: Not hard at all   Food Insecurity: No Food Insecurity    Worried About [address] in the Last Year: Never true    [address] in the Last Year: Never true   Transportation Needs:     Lack of Transportation (Medical): Not on file    Lack of Transportation (Non-Medical):  Not on file   Physical Activity:     Days of Exercise per Week: Not on file    Minutes of Exercise per Session: Not on file   Stress:     Feeling of Stress : Not on file   Social Connections:     Frequency of Communication with Friends and Family: Not on file    Frequency of Social Gatherings with Friends and Family: Not on file    Attends Confucianism Services: Not on file    Active Member of [address] [place] [address] or Organizations: Not on file    Attends Club or Organization Meetings: Not on file    Marital Status: Not on file   Intimate Partner Violence:     Fear of Current or Ex-Partner: Not on file    Emotionally Abused: Not on file    Physically Abused: Not on file    Sexually Abused: Not on file   Housing Stability:     Unable to Pay for Housing in the Last Year: Not on file    Number of [place] in the Last Year: Not on file    Unstable Housing in the Last Year: Not on file       MEDICATIONS:    Current Facility-Administered Medications:     chlordiazePOXIDE (LIBRIUM) capsule 25 mg, 25 mg, Oral, TID PRN, LETY Rey CNP, 25 mg at 05/28/22 0800    tamoxifen (NOLVADEX) tablet 20 mg, 20 mg, Oral, Daily, Kong Pierson MD, 20 mg at 05/30/22 1034    OXcarbazepine (TRILEPTAL) tablet 150 mg, 150 mg, Oral, BID, LETY Rey CNP, 150 mg at 05/30/22 2203    citalopram (CELEXA) tablet 10 mg, 10 mg, Oral, Daily, LETY Rey CNP, 10 mg at 05/30/22 1034    acetaminophen (TYLENOL) tablet 650 mg, 650 mg, Oral, Q6H PRN, Kong Pierson MD    magnesium hydroxide (MILK OF MAGNESIA) 400 MG/5ML suspension 30 mL, 30 mL, Oral, Daily PRN, Kong Pierson MD    aluminum & magnesium hydroxide-simethicone (MAALOX) 200-200-20 MG/5ML suspension 30 mL, 30 mL, Oral, PRN, Kong Pierson MD    hydrOXYzine (VISTARIL) capsule 50 mg, 50 mg, Oral, TID PRN, Kong Pierson MD, 50 mg at 05/28/22 2129    haloperidol (HALDOL) tablet 5 mg, 5 mg, Oral, Q6H PRN **OR** haloperidol lactate (HALDOL) injection 5 mg, 5 mg, IntraMUSCular, Q6H PRN, Kong Pierson MD    melatonin tablet 3 mg, 3 mg, Oral, Nightly, Shimon Reynolds MD, 3 mg at 05/30/22 2203    Examination:  BP (!) 147/83   Pulse (!) 106   Temp 98.8 °F (37.1 °C) (Oral)   Resp 16   LMP  (LMP Unknown)   SpO2 97%   Gait - steady    HOSPITAL COURSE[de-identified]  Following admission to the hospital, patient had a complete physical exam and blood work up, which she was medically cleared and [facility] for psychiatric evaluation and stabilization. The patient was monitored closely with suicide and appropriate precautions. She was started on Trileptal 150 mg twice daily for mood stabilization, Celexa 10 mg daily for depression and anxiety. Patient has significant cluster B personality traits which will remain a static risk factor for her. She denied suicidal ideation during her admission. She was encouraged to participate in group and other milieu activity and started to feel better with this combination of treatment. There has been significant progress in the improvement of symptoms since admission. The patient has been an active participant in his treatment, and discharge planning. The patient was able to spontaneously describe with richness of detail their future plans and goals. The patient was no longer suicidal, homicidal, psychotic or manic. She received the required treatment with medication, participated in group milieu, remained engaged in unit activities and learn appropriate coping skills. She was seen to be watching television playing games and socializing with peers and using the phone. There were no mention or gestures of self-harm or harm to others. Her mental status returned to baseline. The treatment team believes patient has obtained maximum benefit out of this hospitalization and does not meet criteria for inpatient hospitalization anymore. However she will continue to benefit from outpatient follow-up and treatment to maintain stability.   Collateral information has been obtained and reconciled and there are no concerns about her safety. She has no access to guns or weapons. She appreciates the help that she received here. This patient no longer meets criteria for inpatient hospitalization. She was discharged home to her family in psychiatrically stable condition. Appetite:  [x] Normal  [] Increased  [] Decreased    Sleep:       [x] Normal  [] Fair       [] Poor            Energy:    [x] Normal  [] Increased  [] Decreased     SI [] Present  [x] Absent  HI  []Present  [x] Absent   Aggression:  [] yes  [] no  Patient is [x] able  [] unable to CONTRACT FOR SAFETY   Medication side effects(SE):  [x] None(Psych. Meds.) [] Other      Mental Status Examination on discharge:    Level of consciousness:  within normal limits   Appearance:  well-appearing  Behavior/Motor:  no abnormalities noted  Attitude toward examiner:  attentive and good eye contact  Speech:  spontaneous, normal rate and normal volume   Mood: euthymic  Affect:  mood congruent  Thought processes:  linear and goal directed   Thought content: The patient is devoid of suicidal or homicidal ideation intent or plan. Devoid of auditory or visual hallucinations or other perceptual disturbances, there are no overt or covert signs of psychosis or paranoia. There are no neurovegetative signs of depression.   Cognition:  oriented to person, place, and time   Concentration intact  Memory intact  Insight good   Judgement fair   Fund of Knowledge adequate      ASSESSMENT:  Patient symptoms are:  [x] Well controlled  [x] Improving  [] Worsening  [] No change    Reason for more than one antipsychotic:  [x] N/A  [] 3 Failed Monotherapy attempts (Drugs tried:)  [] Crossover to a new antipsychotic  [] Taper to Monotherapy from Polypharmacy  [] Augmentation of clozapine therapy due to treatment resistance to single therapy    Diagnosis:  Principal Problem:    Bipolar 2 disorder, major depressive episode ([facility]  Active Problems:    [facility] traits [facility]  Resolved member was contacted prior to the discharge, patient has been discharged home with her family in psychiatrically stable condition. Medication List      START taking these medications    citalopram 10 MG tablet  Commonly known as: CELEXA  Take 1 tablet by mouth daily     hydrOXYzine 50 MG capsule  Commonly known as: VISTARIL  Take 1 capsule by mouth 3 times daily as needed for Anxiety     melatonin 3 mg Tabs tablet  Take 1 tablet by mouth nightly     OXcarbazepine 150 mg tablet  Commonly known as: TRILEPTAL  Take 1 tablet by mouth 2 times daily        CONTINUE taking these medications    aspirin 81 MG EC tablet     EpiPen 2-Ghanshyam 0.3 MG/0.3ML Soaj injection  Generic drug: EPINEPHrine     Flonase 50 MCG/ACT nasal spray  Generic drug: fluticasone     tamoxifen 20 MG tablet  Commonly known as: NOLVADEX        STOP taking these medications    diazePAM 5 MG tablet  Commonly known as: VALIUM     LORazepam 1 MG tablet  Commonly known as: ATIVAN     venlafaxine 150 MG extended release capsule  Commonly known as: EFFEXOR XR           Where to Get Your Medications      These medications were sent to [facility]/pharmacy #[id number]- [place], [place] - [address] [facility] [phone] -  [phone]  [address] [place] [address] Dr, [place] [address]    Phone: [phone]   · citalopram 10 MG tablet  · hydrOXYzine 50 MG capsule  · OXcarbazepine 150 mg tablet     You can get these medications from any pharmacy    You don't need a prescription for these medications  · melatonin 3 mg Tabs tablet       NOTE: This report was transcribed using voice recognition software. Every effort was made to ensure accuracy; however, inadvertent computerized transcription errors may be present.     TIME SPEND - 35 MINUTES TO COMPLETE THE EVALUATION, DISCHARGE SUMMARY, MEDICATION RECONCILIATION AND FOLLOW UP CARE     Signed:  LETY Lainez CNP  5/31/2022  7:59 AM

## 2022-05-31 NOTE — PLAN OF CARE
Problem: Safety - Adult  Goal: Free from fall injury  Outcome: Adequate for Discharge     Problem: Self Harm/Suicidality  Goal: Will have no self-injury during hospital stay  Description: INTERVENTIONS:  1. Q 30 MINUTES: Routine safety checks  2. Q SHIFT & PRN: Assess risk to determine if routine checks are adequate to maintain patient safety  Outcome: Adequate for Discharge     Problem: Anxiety  Goal: Will report anxiety at manageable levels  Description: INTERVENTIONS:  1. Administer medication as ordered  2. Teach and rehearse alternative coping skills  3. Provide emotional support with 1:1 interaction with staff  Outcome: Adequate for Discharge     Problem: Involuntary Admit  Goal: Will cooperate with staff recommendations and doctor's orders and will demonstrate appropriate behavior  Description: INTERVENTIONS:  1. Treat underlying conditions and offer medication as ordered  2. Educate regarding involuntary admission procedures and rules  3.  Contain excessive/inappropriate behavior per unit and hospital policies  Outcome: Adequate for Discharge     Problem: Pain  Goal: Verbalizes/displays adequate comfort level or baseline comfort level  Outcome: Adequate for Discharge

## 2022-05-31 NOTE — PROGRESS NOTES
[facility]  Discharge Note    Pt discharged with followings belongings:   Dental Appliances: None  Vision - Corrective Lenses: None  Hearing Aid: None  Jewelry: None  Body Piercings Removed: N/A  Clothing: Sent home,Other (Comment) (sent with  at [facility] ED)  Other Valuables: At home   Valuables sent home with patient or returned to patient. Patient education on aftercare instructions: Yes  at 1:08 PM .Patient verbalize understanding of AVS:  Yes. Status EXAM upon discharge:  Mental Status and Behavioral Exam  Normal: No  Level of Assistance: Independent/Self  Facial Expression: Brightened  Affect: Congruent  Level of Consciousness: Alert  Frequency of Checks: 4 times per hour, close  Mood:Normal: Yes  Mood: Anxious  Motor Activity:Normal: Yes  Motor Activity: Decreased  Eye Contact: Good  Observed Behavior: Cooperative,Friendly  Sexual Misconduct History: Current - no  Preception: [place] to person,[place] to time,[place] to place,[place] to situation  Attention:Normal: Yes  Thought Processes: Circumstantial  Thought Content:Normal: Yes  Depression Symptoms: No problems reported or observed. Anxiety Symptoms: Generalized  [place] Symptoms: No problems reported or observed.   Hallucinations: None  Delusions: No  Memory:Normal: Yes  Insight and Judgment: Yes (Improving)  Insight and Judgment: Poor judgment,Poor insight      Metabolic Screening:    Lab Results   Component Value Date    LABA1C 5.5 09/15/2021       Lab Results   Component Value Date    CHOL 179 09/15/2021     Lab Results   Component Value Date    TRIG 185 (H) 09/15/2021     Lab Results   Component Value Date    HDL 59 09/15/2021     No components found for: [facility] EVALUATION AND TREATMENT [place]  Lab Results   Component Value Date    LABVLDL 37 09/15/2021       Nereida Post RN

## 2022-06-08 ENCOUNTER — OFFICE VISIT (OUTPATIENT)
Dept: FAMILY MEDICINE CLINIC | Age: 52
End: 2022-06-08
Payer: COMMERCIAL

## 2022-06-08 VITALS
SYSTOLIC BLOOD PRESSURE: 122 MMHG | TEMPERATURE: 97.5 F | HEIGHT: 61 IN | WEIGHT: 210 LBS | HEART RATE: 88 BPM | OXYGEN SATURATION: 98 % | RESPIRATION RATE: 16 BRPM | DIASTOLIC BLOOD PRESSURE: 74 MMHG | BODY MASS INDEX: 39.65 KG/M2

## 2022-06-08 DIAGNOSIS — F60.89 CLUSTER B PERSONALITY DISORDER (HCC): ICD-10-CM

## 2022-06-08 DIAGNOSIS — T14.91XA SUICIDE ATTEMPT (HCC): ICD-10-CM

## 2022-06-08 DIAGNOSIS — Z09 HOSPITAL DISCHARGE FOLLOW-UP: Primary | ICD-10-CM

## 2022-06-08 DIAGNOSIS — F31.81 BIPOLAR 2 DISORDER, MAJOR DEPRESSIVE EPISODE (HCC): ICD-10-CM

## 2022-06-08 PROCEDURE — 1111F DSCHRG MED/CURRENT MED MERGE: CPT | Performed by: STUDENT IN AN ORGANIZED HEALTH CARE EDUCATION/TRAINING PROGRAM

## 2022-06-08 PROCEDURE — 99214 OFFICE O/P EST MOD 30 MIN: CPT | Performed by: STUDENT IN AN ORGANIZED HEALTH CARE EDUCATION/TRAINING PROGRAM

## 2022-06-08 RX ORDER — HYDROXYZINE PAMOATE 50 MG/1
50 CAPSULE ORAL 3 TIMES DAILY PRN
Qty: 90 CAPSULE | Refills: 3 | Status: SHIPPED
Start: 2022-06-08 | End: 2022-07-05 | Stop reason: SDUPTHER

## 2022-06-08 ASSESSMENT — PATIENT HEALTH QUESTIONNAIRE - PHQ9
SUM OF ALL RESPONSES TO PHQ QUESTIONS 1-9: 19
2. FEELING DOWN, DEPRESSED OR HOPELESS: 3
7. TROUBLE CONCENTRATING ON THINGS, SUCH AS READING THE NEWSPAPER OR WATCHING TELEVISION: 3
9. THOUGHTS THAT YOU WOULD BE BETTER OFF DEAD, OR OF HURTING YOURSELF: 0
3. TROUBLE FALLING OR STAYING ASLEEP: 3
5. POOR APPETITE OR OVEREATING: 1
SUM OF ALL RESPONSES TO PHQ QUESTIONS 1-9: 19
4. FEELING TIRED OR HAVING LITTLE ENERGY: 3
SUM OF ALL RESPONSES TO PHQ QUESTIONS 1-9: 19
SUM OF ALL RESPONSES TO PHQ9 QUESTIONS 1 & 2: 6
8. MOVING OR SPEAKING SO SLOWLY THAT OTHER PEOPLE COULD HAVE NOTICED. OR THE OPPOSITE, BEING SO FIGETY OR RESTLESS THAT YOU HAVE BEEN MOVING AROUND A LOT MORE THAN USUAL: 0
10. IF YOU CHECKED OFF ANY PROBLEMS, HOW DIFFICULT HAVE THESE PROBLEMS MADE IT FOR YOU TO DO YOUR WORK, TAKE CARE OF THINGS AT HOME, OR GET ALONG WITH OTHER PEOPLE: 1
6. FEELING BAD ABOUT YOURSELF - OR THAT YOU ARE A FAILURE OR HAVE LET YOURSELF OR YOUR FAMILY DOWN: 3
1. LITTLE INTEREST OR PLEASURE IN DOING THINGS: 3
SUM OF ALL RESPONSES TO PHQ QUESTIONS 1-9: 19

## 2022-07-05 DIAGNOSIS — F60.89 CLUSTER B PERSONALITY DISORDER (HCC): ICD-10-CM

## 2022-07-05 DIAGNOSIS — T14.91XA SUICIDE ATTEMPT (HCC): ICD-10-CM

## 2022-07-05 DIAGNOSIS — F31.81 BIPOLAR 2 DISORDER, MAJOR DEPRESSIVE EPISODE (HCC): ICD-10-CM

## 2022-07-05 RX ORDER — HYDROXYZINE PAMOATE 50 MG/1
50 CAPSULE ORAL 3 TIMES DAILY PRN
Qty: 90 CAPSULE | Refills: 3 | Status: SHIPPED
Start: 2022-07-05 | End: 2022-07-06 | Stop reason: DRUGHIGH

## 2022-07-06 ENCOUNTER — OFFICE VISIT (OUTPATIENT)
Dept: FAMILY MEDICINE CLINIC | Age: 52
End: 2022-07-06
Payer: COMMERCIAL

## 2022-07-06 VITALS
HEART RATE: 93 BPM | HEIGHT: 61 IN | BODY MASS INDEX: 39.27 KG/M2 | SYSTOLIC BLOOD PRESSURE: 136 MMHG | TEMPERATURE: 98 F | WEIGHT: 208 LBS | DIASTOLIC BLOOD PRESSURE: 82 MMHG | OXYGEN SATURATION: 99 %

## 2022-07-06 DIAGNOSIS — F31.81 BIPOLAR 2 DISORDER, MAJOR DEPRESSIVE EPISODE (HCC): Primary | ICD-10-CM

## 2022-07-06 DIAGNOSIS — Z11.1 VISIT FOR TB SKIN TEST: ICD-10-CM

## 2022-07-06 DIAGNOSIS — Z91.030 BEE STING ALLERGY: ICD-10-CM

## 2022-07-06 DIAGNOSIS — F41.9 ANXIETY: ICD-10-CM

## 2022-07-06 DIAGNOSIS — J02.9 SORE THROAT: ICD-10-CM

## 2022-07-06 PROCEDURE — 99214 OFFICE O/P EST MOD 30 MIN: CPT | Performed by: STUDENT IN AN ORGANIZED HEALTH CARE EDUCATION/TRAINING PROGRAM

## 2022-07-06 PROCEDURE — 86580 TB INTRADERMAL TEST: CPT | Performed by: STUDENT IN AN ORGANIZED HEALTH CARE EDUCATION/TRAINING PROGRAM

## 2022-07-06 RX ORDER — CITALOPRAM 10 MG/1
10 TABLET ORAL DAILY
Qty: 30 TABLET | Refills: 3 | Status: SHIPPED | OUTPATIENT
Start: 2022-07-06 | End: 2022-08-05

## 2022-07-06 RX ORDER — HYDROXYZINE HYDROCHLORIDE 25 MG/1
25 TABLET, FILM COATED ORAL NIGHTLY
Qty: 30 TABLET | Refills: 2 | Status: SHIPPED
Start: 2022-07-06 | End: 2022-07-07 | Stop reason: SDUPTHER

## 2022-07-06 RX ORDER — BENAZEPRIL HYDROCHLORIDE 20 MG/1
TABLET ORAL
COMMUNITY
Start: 2022-07-01

## 2022-07-06 RX ORDER — LAMOTRIGINE 25(21)-50
25 KIT ORAL DAILY
Qty: 1 KIT | Refills: 0 | Status: SHIPPED | OUTPATIENT
Start: 2022-07-06

## 2022-07-06 RX ORDER — DOXYCYCLINE HYCLATE 100 MG
100 TABLET ORAL 2 TIMES DAILY
Qty: 20 TABLET | Refills: 0 | Status: SHIPPED | OUTPATIENT
Start: 2022-07-06 | End: 2022-07-16

## 2022-07-06 RX ORDER — EPINEPHRINE 0.3 MG/.3ML
0.3 INJECTION SUBCUTANEOUS PRN
Qty: 1 EACH | Refills: 3 | Status: SHIPPED | OUTPATIENT
Start: 2022-07-06

## 2022-07-06 ASSESSMENT — ENCOUNTER SYMPTOMS
SHORTNESS OF BREATH: 0
SORE THROAT: 1
SINUS PAIN: 0
COUGH: 0
VOMITING: 0
NAUSEA: 0
BACK PAIN: 0
EYE PAIN: 0
RHINORRHEA: 1
CONSTIPATION: 0
DIARRHEA: 0
SINUS PRESSURE: 0
ABDOMINAL PAIN: 0
EYE REDNESS: 0

## 2022-07-06 NOTE — PROGRESS NOTES
7/6/2022    [facility]  Chief Complaint   Patient presents with    Employment Physical     needs 2 step TB    Pharyngitis     x 3 weeks intermittently was prescribed Augmentin and Steroid       Ana Luisa Menjivar is a 54 y.o. female who  has a past medical history of Cancer ([address] Depression, Fibromyalgia, and Hypertension. Ana Luisa Menjivar , 54 y.o. female presents to the clinic for evaluation of sore throat congestion runny nose nasal drip ear pressure x 21 days. The patient has taken steroids and antibiotics for symptoms. The patient reports no known ill exposure. The patient denies acute loss of taste or smell, headache, cough, rash. The patient denies body aches, chills, fever, and fatigue. The patient also denies chest pain, abdominal pain, shortness of breath, wheezing, and nausea / vomiting / diarrhea. Ana Luisa Menjivar is a 54 y.o. female who presents for follow up of anxiety disorder. She denies current suicidal and homicidal ideation. She complains of the following side effects from the treatment: leg swelling with the trileptal. She did stop this on her own. She is going to see a psychiatrist on the 27th. She is feeling better talking with the counselor. Review of Systems   Constitutional: Negative for chills, fatigue and fever. HENT: Positive for congestion, postnasal drip, rhinorrhea and sore throat. Negative for ear pain, sinus pressure and sinus pain. Eyes: Negative for pain and redness. Respiratory: Negative for cough and shortness of breath. Cardiovascular: Negative for chest pain. Gastrointestinal: Negative for abdominal pain, constipation, diarrhea, nausea and vomiting. Genitourinary: Negative for difficulty urinating and dysuria. Musculoskeletal: Negative for back pain, myalgias and neck pain. Skin: Negative for rash. Neurological: Negative for dizziness, light-headedness and numbness. Psychiatric/Behavioral: Positive for dysphoric mood.  The patient is nervous/anxious. Prior to Visit Medications    Medication Sig Taking? Authorizing Provider   benazepril (LOTENSIN) 20 MG tablet  Yes Historical Provider, MD   citalopram (CELEXA) 10 MG tablet Take 1 tablet by mouth daily Yes Hilda Patterson DO   EPINEPHrine (EPIPEN 2-DIONNE) 0.3 MG/0.3ML SOAJ injection Inject 0.3 mLs into the muscle as needed (ALLERGIC REACTION FROM BEE VENOM) Yes Hilda Patterson DO   doxycycline hyclate (VIBRA-TABS) 100 MG tablet Take 1 tablet by mouth 2 times daily for 10 days Yes Hilda Patterson DO   hydrOXYzine HCl (ATARAX) 25 MG tablet Take 1 tablet by mouth nightly Yes Hilda Patterson DO   lamoTRIgine (LAMICTAL ODT) 21 x 25 MG & 7 x 50 MG KIT Take 25 mg by mouth daily Yes Hilda Patterson DO   tamoxifen (NOLVADEX) 20 MG tablet Take 20 mg by mouth daily -TAKE W/ H20- Yes Historical Provider, MD   aspirin 81 MG EC tablet Take 81 mg by mouth daily Yes Historical Provider, MD        Allergies   Allergen Reactions    Bee Venom Anaphylaxis     Patient does carry an Epi-Pen.        Past Medical History:   Diagnosis Date    Cancer ([facility]     breast     Depression     Fibromyalgia     Hypertension        Past Surgical History:   Procedure Laterality Date    BREAST SURGERY      CHOLECYSTECTOMY      ENDOMETRIAL ABLATION      TUBAL LIGATION         Social History     Socioeconomic History    Marital status:      Spouse name: Not on file    Number of children: Not on file    Years of education: Not on file    Highest education level: Not on file   Occupational History    Not on file   Tobacco Use    Smoking status: Never Smoker    Smokeless tobacco: Never Used   Vaping Use    Vaping Use: Never used   Substance and Sexual Activity    Alcohol use: Not Currently     Comment: socially    Drug use: Not Currently    Sexual activity: Not on file   Other Topics Concern    Not on file   Social History Narrative    Not on file     Social Determinants of Health     Financial Resource Strain: Low Risk     Difficulty of Paying Living Expenses: Not hard at all   Food Insecurity: No Food Insecurity    Worried About Running Out of Food in the Last Year: Never true    Bg of Food in the Last Year: Never true   Transportation Needs:     Lack of Transportation (Medical): Not on file    Lack of Transportation (Non-Medical): Not on file   Physical Activity:     Days of Exercise per Week: Not on file    Minutes of Exercise per Session: Not on file   Stress:     Feeling of Stress : Not on file   Social Connections:     Frequency of Communication with Friends and Family: Not on file    Frequency of Social Gatherings with Friends and Family: Not on file    Attends Roman Catholic Services: Not on file    Active Member of [address] [place] [address] [employer] or Organizations: Not on file    Attends Club or Organization Meetings: Not on file    Marital Status: Not on file   Intimate Partner Violence:     Fear of Current or Ex-Partner: Not on file    Emotionally Abused: Not on file    Physically Abused: Not on file    Sexually Abused: Not on file   Housing Stability:     Unable to Pay for Housing in the Last Year: Not on file    Number of [place] in the Last Year: Not on file    Unstable Housing in the Last Year: Not on file        Family History   Problem Relation Age of Onset    Cancer Father            Vitals:    07/06/22 1427   BP: 136/82   Pulse: 93   Temp: 98 °F (36.7 °C)   TempSrc: Temporal   SpO2: 99%   Weight: 208 lb (94.3 kg)   Height: 5' 1\" (1.549 m)     Estimated body mass index is 39.3 kg/m² as calculated from the following:    Height as of this encounter: 5' 1\" (1.549 m). Weight as of this encounter: 208 lb (94.3 kg).     Most Recent Labs  CBC  Lab Results   Component Value Date/Time    WBC 7.2 05/24/2022 12:16 PM    WBC 6.5 09/15/2021 12:00 PM    WBC 9.8 05/14/2021 10:31 PM    RBC 4.21 05/24/2022 12:16 PM    RBC 4.39 09/15/2021 12:00 PM    RBC 4.11 05/14/2021 10:31 PM    HGB 12.0 05/24/2022 12:16 PM HGB 12.4 09/15/2021 12:00 PM    HGB 11.8 05/14/2021 10:31 PM    HCT 37.6 05/24/2022 12:16 PM    HCT 38.6 09/15/2021 12:00 PM    HCT 37.1 05/14/2021 10:31 PM    MCV 89.3 05/24/2022 12:16 PM    MCV 87.9 09/15/2021 12:00 PM    MCV 90.3 05/14/2021 10:31 PM     05/24/2022 12:16 PM     09/15/2021 12:00 PM     05/14/2021 10:31 PM      CMP  Lab Results   Component Value Date/Time     05/24/2022 12:16 PM     09/15/2021 12:00 PM     05/14/2021 10:31 PM    K 4.4 05/24/2022 12:16 PM    K 4.6 09/15/2021 12:00 PM    K 4.2 05/14/2021 10:31 PM    K 4.2 05/04/2021 05:50 AM     05/24/2022 12:16 PM     09/15/2021 12:00 PM     05/14/2021 10:31 PM    CO2 24 05/24/2022 12:16 PM    CO2 21 09/15/2021 12:00 PM    CO2 31 05/14/2021 10:31 PM    ANIONGAP 12 05/24/2022 12:16 PM    ANIONGAP 17 09/15/2021 12:00 PM    ANIONGAP 7 05/14/2021 10:31 PM    GLUCOSE 114 05/24/2022 12:16 PM    GLUCOSE 92 09/15/2021 12:00 PM    GLUCOSE 83 05/14/2021 10:31 PM    BUN 11 05/24/2022 12:16 PM    BUN 11 09/15/2021 12:00 PM    BUN 11 05/14/2021 10:31 PM    CREATININE 0.8 05/24/2022 12:16 PM    CREATININE 0.6 09/15/2021 12:00 PM    CREATININE 0.7 05/14/2021 10:31 PM    LABGLOM >60 05/24/2022 12:16 PM    LABGLOM >60 09/15/2021 12:00 PM    LABGLOM >60 05/14/2021 10:31 PM    GFRAA >60 05/24/2022 12:16 PM    GFRAA >60 09/15/2021 12:00 PM    GFRAA >60 05/14/2021 10:31 PM    CALCIUM 9.1 05/24/2022 12:16 PM    CALCIUM 9.4 09/15/2021 12:00 PM    CALCIUM 9.6 05/14/2021 10:31 PM    PROT 6.9 05/24/2022 12:16 PM    PROT 7.1 09/15/2021 12:00 PM    PROT 6.6 05/14/2021 10:31 PM    LABALBU 4.1 05/24/2022 12:16 PM    LABALBU 4.1 09/15/2021 12:00 PM    LABALBU 3.9 05/14/2021 10:31 PM    BILITOT 0.3 05/24/2022 12:16 PM    BILITOT 0.3 09/15/2021 12:00 PM    BILITOT <0.2 05/14/2021 10:31 PM    ALKPHOS 67 05/24/2022 12:16 PM    ALKPHOS 43 09/15/2021 12:00 PM    ALKPHOS 67 05/14/2021 10:31 PM    AST 19 05/24/2022 12:16 PM Normal range of motion and neck supple. Skin:     General: Skin is warm and dry. Capillary Refill: Capillary refill takes less than 2 seconds. Neurological:      General: No focal deficit present. Mental Status: She is alert and oriented to person, place, and time. Psychiatric:         Mood and Affect: Mood normal.         Behavior: Behavior normal.         Thought Content: Thought content normal.         ASSESSMENT/PLAN:  Jose Pickard was seen today for employment physical and pharyngitis. Diagnoses and all orders for this visit:    Bipolar 2 disorder, major [facility]  -     citalopram (CELEXA) 10 MG tablet; Take 1 tablet by mouth daily  -     lamoTRIgine (LAMICTAL ODT) 21 x 25 MG & 7 x 50 MG KIT; Take 25 mg by mouth daily    Bee sting allergy  -     EPINEPHrine (EPIPEN 2-DIONNE) 0.3 MG/0.3ML SOAJ injection; Inject 0.3 mLs into the muscle as needed (ALLERGIC REACTION FROM BEE VENOM)    Sore throat  -     doxycycline hyclate (VIBRA-TABS) 100 MG tablet; Take 1 tablet by mouth 2 times daily for 10 days    Visit for TB skin test  -     tuberculin injection 5 Units    Anxiety  -     hydrOXYzine HCl (ATARAX) 25 MG tablet; Take 1 tablet by mouth nightly         As above. Call or go to the nearest ED immediately if symptoms worsen or persist.  Educational materials and/or home exercises printed for patient's review and were included in patient instructions on his/her After Visit Summary and given to patient at the end of visit. Counseled regarding above diagnosis, including possible risks and complications,  especially if left uncontrolled. Counseled regarding the possible side effects, risks, benefits and alternatives to treatment; patient and/or guardian verbalizes understanding, agrees, feels comfortable with and wishes to proceed with above treatment plan.      Advised patient to call with any new medication issues, and read all Rx info from pharmacy to assure aware of all possible risks and side effects of medication before taking. Reviewed age and gender appropriate health screening exams and vaccinations. Advised patient regarding importance of keeping up with recommended health maintenance and to schedule as soon as possible if overdue, as this is important in assessing for undiagnosed pathology, especially cancer, as well as protecting against potentially harmful/life threatening disease. Patient and/or guardian verbalizes understanding and agrees with above counseling, assessment and plan. All questions answered. Return in about 4 weeks (around 8/3/2022) for anxiety. An  electronic signature was used to authenticate this note. --Elena Soler DO on 7/6/2022 at 3:03 PM    This document was prepared at least partially through the use of voice recognition software. Although effort is taken to assure the accuracy of this document, it is possible that grammatical, syntax,  or spelling errors may occur.

## 2022-07-07 DIAGNOSIS — F41.9 ANXIETY: ICD-10-CM

## 2022-07-07 RX ORDER — HYDROXYZINE HYDROCHLORIDE 25 MG/1
25 TABLET, FILM COATED ORAL NIGHTLY
Qty: 90 TABLET | Refills: 1 | Status: SHIPPED | OUTPATIENT
Start: 2022-07-07 | End: 2022-08-06

## 2022-07-07 NOTE — TELEPHONE ENCOUNTER
7/6/2022 7/8/2022      Received fax from [facility] and requesting 90 days supply of Hydroxyzine. However instructions state take 1 capsule by mouth 3 times daily as needed for anxiety and in our chart medication states take 1 tablet nightly.

## 2022-07-08 ENCOUNTER — NURSE ONLY (OUTPATIENT)
Dept: FAMILY MEDICINE CLINIC | Age: 52
End: 2022-07-08
Payer: COMMERCIAL

## 2022-07-08 DIAGNOSIS — Z11.1 VISIT FOR TB SKIN TEST: Primary | ICD-10-CM

## 2022-07-08 LAB
INDURATION: NORMAL
TB SKIN TEST: NEGATIVE

## 2022-07-15 ENCOUNTER — NURSE ONLY (OUTPATIENT)
Dept: FAMILY MEDICINE CLINIC | Age: 52
End: 2022-07-15
Payer: COMMERCIAL

## 2022-07-15 DIAGNOSIS — Z11.1 VISIT FOR TB SKIN TEST: Primary | ICD-10-CM

## 2022-07-15 PROCEDURE — 86580 TB INTRADERMAL TEST: CPT | Performed by: STUDENT IN AN ORGANIZED HEALTH CARE EDUCATION/TRAINING PROGRAM

## 2022-07-15 NOTE — PROGRESS NOTES
Pt came in for 2nd step of TB and needs read on monday 07/18/2022 - Placed in right forearm. Pt tolerated well.

## 2022-07-18 ENCOUNTER — NURSE ONLY (OUTPATIENT)
Dept: FAMILY MEDICINE CLINIC | Age: 52
End: 2022-07-18

## 2022-07-18 LAB
INDURATION: NORMAL
TB SKIN TEST: NEGATIVE

## 2022-09-19 RX ORDER — HYDROXYZINE PAMOATE 50 MG/1
CAPSULE ORAL
Qty: 90 CAPSULE | Refills: 2 | Status: SHIPPED
Start: 2022-09-19 | End: 2022-09-21 | Stop reason: SDUPTHER

## 2022-09-19 RX ORDER — LAMOTRIGINE 25 MG/1
TABLET ORAL
COMMUNITY
Start: 2022-08-25

## 2022-09-19 RX ORDER — HYDROXYZINE PAMOATE 50 MG/1
CAPSULE ORAL
COMMUNITY
Start: 2022-08-16 | End: 2022-09-19 | Stop reason: SDUPTHER

## 2022-09-21 RX ORDER — HYDROXYZINE PAMOATE 50 MG/1
CAPSULE ORAL
Qty: 270 CAPSULE | Refills: 1 | Status: SHIPPED | OUTPATIENT
Start: 2022-09-21

## 2023-01-04 ENCOUNTER — OFFICE VISIT (OUTPATIENT)
Dept: OBGYN | Age: 53
End: 2023-01-04
Payer: COMMERCIAL

## 2023-01-04 VITALS
BODY MASS INDEX: 39.23 KG/M2 | DIASTOLIC BLOOD PRESSURE: 69 MMHG | SYSTOLIC BLOOD PRESSURE: 137 MMHG | WEIGHT: 207.6 LBS | HEART RATE: 84 BPM

## 2023-01-04 DIAGNOSIS — Z01.419 ENCOUNTER FOR GYNECOLOGICAL EXAMINATION: Primary | ICD-10-CM

## 2023-01-04 PROCEDURE — 99213 OFFICE O/P EST LOW 20 MIN: CPT | Performed by: OBSTETRICS & GYNECOLOGY

## 2023-01-04 PROCEDURE — 99396 PREV VISIT EST AGE 40-64: CPT | Performed by: OBSTETRICS & GYNECOLOGY

## 2023-01-04 NOTE — PROGRESS NOTES
HISTORY OF PRESENT ILLNESS:    50 y.o. female  [id number] presents for her annual exam.     Patient's last menstrual period was 2022 (approximate). Periods are: irregular  Contraception: tubal  Number of new sexual partners: 0  Most recent pap smear:  neg cytology, neg HPV  History of abnormal pap smears: none  Most recent mammogram:  breast cancer  History of abnormal mammogram: as above  Most recent colonoscopy: 2016 polyps removed  Changes to health since last visit: n/a  Complaints: 2021 breast cancer Stage 2 DCIS in one breast, 2021 double mastectomy, no chemo or radiation. On tamoxifen. Has been having hot flashes and irritability. Some improvement with black cohosh  Pt had breast augmentation in 2022. Sees Dr. Merlin Muse. Oncologist Dr. Dee Huynh is at [facility] in [facility]. She sees her every 6 months. Pt admits heavy periods x2 recently.     Past Medical History:   Past Medical History:   Diagnosis Date    Cancer ([facility] [address]     breast     Depression     Fibromyalgia     Hypertension                                              OB History    Para Term  AB Living   4 4 4     4   SAB IAB Ectopic Molar Multiple Live Births             4      # Outcome Date GA Lbr Ghassan/2nd Weight Sex Delivery Anes PTL Lv   4 Term 95    M Vag-Spont   KARELY   3 Term 92    F Vag-Spont   KARELY   2 Term 90    F Vag-Spont   KARELY   1 Term 89    F Vag-Spont  N KARELY          Past Surgical History:   Past Surgical History:   Procedure Laterality Date    BREAST ENHANCEMENT SURGERY      BREAST SURGERY      double mastectomy    CHOLECYSTECTOMY      ENDOMETRIAL ABLATION      TUBAL LIGATION          Allergies: Bee venom     Medications:   Current Outpatient Medications   Medication Sig Dispense Refill    lamoTRIgine (LAMICTAL) 25 MG tablet TAKE 2 TABLETS ORALLY ONCE PER DAY FOR 3 MONTHS      benazepril (LOTENSIN) 20 MG tablet       EPINEPHrine (EPIPEN 2-DIONNE) 0.3 MG/0.3ML SOAJ injection Inject 0.3 mLs into the muscle as needed (ALLERGIC REACTION FROM BEE VENOM) 1 each 3    lamoTRIgine (LAMICTAL ODT) 21 x 25 MG & 7 x 50 MG KIT Take 25 mg by mouth daily 1 kit 0    tamoxifen (NOLVADEX) 20 MG tablet Take 20 mg by mouth daily -TAKE W/ H20-      aspirin 81 MG EC tablet Take 81 mg by mouth daily      citalopram (CELEXA) 10 MG tablet Take 1 tablet by mouth daily 30 tablet 3     No current facility-administered medications for this visit. Social History:   Social History     Tobacco Use    Smoking status: Never    Smokeless tobacco: Never   Substance Use Topics    Alcohol use: Not Currently     Comment: socially        Family History:   Family History   Problem Relation Age of Onset    Cancer Father        REVIEW OF SYSTEMS:    Constitutional: negative  HEENT: negative  Breast: as above  Respiratory: negative  Cardiovascular: negative  Gastrointestinal: negative  Genitourinary:negative  Integument: negative  Neurological: negative  Endocrine: negative          PHYSICAL EXAM  /69 (Site: Right Upper Arm, Position: Sitting)   Pulse 84   Wt 207 lb 9.6 oz (94.2 kg)   LMP 12/14/2022 (Approximate)   BMI 39.23 kg/m²       General appearance: alert, cooperative and in no acute distress. Head: NCAT   Neck: Neck supple, no mass  Breasts: bilateral implants noted. No masses, dimpling, tenderness.    Lungs: clear to auscultation bilaterally  Heart: regular rate and rhythm, no murmurs  Abdomen: soft, nontender, nondistended, no masses palpable, no rebound tenderness, no guarding or rigidity  Skin: No suspicious lesions  Neurologic: Alert and oriented, no focal deficits  Extremities: symmetrical without clubbing or cynosis  Psych: Alert, oriented, mood/affect full range, no acute distress    Pelvic Exam:   EXTERNAL GENITALIA: normal external genitalia, no external lesions  VAGINA: normal rugae, no discharge ,Grade 2 rectocele  CERVIX: normal appearing, no lesions, no bleeding  UTERUS: uterus is normal size, shape, consistency and nontender   ADNEXA: normal adnexa in size, nontender and no masses. RECTUM: hemorrhoid present         ASSESSMENT : Routine Annual Exam,    Diagnosis Orders   1. Encounter for gynecological examination               PLAN:    Return in about 1 year (around 1/4/2024) for Annual exam.  Discussed monitoring cycles and if heavy menses continues over the next few months she is to return for endometrial biopsy. Pt voiced understanding. No orders of the defined types were placed in this encounter. No orders of the defined types were placed in this encounter.         Electronically signed by Lavonda Kayser, DO on 12/1/21

## 2023-01-04 NOTE — PROGRESS NOTES
Patient alert and pleasant with no complaints  Here today for annual GYN visit. Pelvic exam performed, no specimen obtained. Discharge instructions have been discussed with the patient. Patient advised to call our office with any questions or concerns. Voiced understanding.

## 2023-01-27 DIAGNOSIS — F31.81 BIPOLAR 2 DISORDER, MAJOR DEPRESSIVE EPISODE (HCC): ICD-10-CM

## 2023-08-02 ENCOUNTER — OFFICE VISIT (OUTPATIENT)
Dept: FAMILY MEDICINE CLINIC | Age: 53
End: 2023-08-02
Payer: COMMERCIAL

## 2023-08-02 VITALS
WEIGHT: 218.4 LBS | HEART RATE: 104 BPM | DIASTOLIC BLOOD PRESSURE: 86 MMHG | RESPIRATION RATE: 16 BRPM | BODY MASS INDEX: 42.88 KG/M2 | OXYGEN SATURATION: 98 % | HEIGHT: 60 IN | SYSTOLIC BLOOD PRESSURE: 136 MMHG | TEMPERATURE: 98.6 F

## 2023-08-02 DIAGNOSIS — R73.9 HYPERGLYCEMIA: ICD-10-CM

## 2023-08-02 DIAGNOSIS — I10 ESSENTIAL HYPERTENSION: Primary | ICD-10-CM

## 2023-08-02 DIAGNOSIS — R22.43 LOCALIZED SWELLING OF BOTH LOWER LEGS: ICD-10-CM

## 2023-08-02 DIAGNOSIS — K21.9 GASTROESOPHAGEAL REFLUX DISEASE WITHOUT ESOPHAGITIS: ICD-10-CM

## 2023-08-02 DIAGNOSIS — I10 ESSENTIAL HYPERTENSION: ICD-10-CM

## 2023-08-02 LAB
ABSOLUTE IMMATURE GRANULOCYTE: 0.03 K/UL (ref 0–0.58)
BASOPHILS ABSOLUTE: 0.04 K/UL (ref 0–0.2)
BASOPHILS RELATIVE PERCENT: 1 % (ref 0–2)
EOSINOPHILS ABSOLUTE: 0.19 K/UL (ref 0.05–0.5)
EOSINOPHILS RELATIVE PERCENT: 3 % (ref 0–6)
HBA1C MFR BLD: 5.4 % (ref 4–5.6)
HCT VFR BLD CALC: 39.5 % (ref 34–48)
HEMOGLOBIN: 12.2 G/DL (ref 11.5–15.5)
IMMATURE GRANULOCYTES: 0 % (ref 0–5)
LYMPHOCYTES ABSOLUTE: 2.21 K/UL (ref 1.5–4)
LYMPHOCYTES RELATIVE PERCENT: 31 % (ref 20–42)
MCH RBC QN AUTO: 28.6 PG (ref 26–35)
MCHC RBC AUTO-ENTMCNC: 30.9 G/DL (ref 32–34.5)
MCV RBC AUTO: 92.7 FL (ref 80–99.9)
MONOCYTES ABSOLUTE: 0.51 K/UL (ref 0.1–0.95)
MONOCYTES RELATIVE PERCENT: 7 % (ref 2–12)
NEUTROPHILS ABSOLUTE: 4.23 K/UL (ref 1.8–7.3)
NEUTROPHILS RELATIVE PERCENT: 59 % (ref 43–80)
PDW BLD-RTO: 13.7 % (ref 11.5–15)
PLATELET # BLD: 250 K/UL (ref 130–450)
PMV BLD AUTO: 11.2 FL (ref 7–12)
RBC # BLD: 4.26 M/UL (ref 3.5–5.5)
WBC # BLD: 7.2 K/UL (ref 4.5–11.5)

## 2023-08-02 PROCEDURE — 3079F DIAST BP 80-89 MM HG: CPT | Performed by: STUDENT IN AN ORGANIZED HEALTH CARE EDUCATION/TRAINING PROGRAM

## 2023-08-02 PROCEDURE — 99214 OFFICE O/P EST MOD 30 MIN: CPT | Performed by: STUDENT IN AN ORGANIZED HEALTH CARE EDUCATION/TRAINING PROGRAM

## 2023-08-02 PROCEDURE — 3075F SYST BP GE 130 - 139MM HG: CPT | Performed by: STUDENT IN AN ORGANIZED HEALTH CARE EDUCATION/TRAINING PROGRAM

## 2023-08-02 RX ORDER — OMEPRAZOLE 20 MG/1
20 CAPSULE, DELAYED RELEASE ORAL
Qty: 30 CAPSULE | Refills: 3 | Status: SHIPPED | OUTPATIENT
Start: 2023-08-02

## 2023-08-02 SDOH — ECONOMIC STABILITY: FOOD INSECURITY: WITHIN THE PAST 12 MONTHS, THE FOOD YOU BOUGHT JUST DIDN'T LAST AND YOU DIDN'T HAVE MONEY TO GET MORE.: NEVER TRUE

## 2023-08-02 SDOH — ECONOMIC STABILITY: INCOME INSECURITY: HOW HARD IS IT FOR YOU TO PAY FOR THE VERY BASICS LIKE FOOD, HOUSING, MEDICAL CARE, AND HEATING?: NOT HARD AT ALL

## 2023-08-02 SDOH — ECONOMIC STABILITY: HOUSING INSECURITY
IN THE LAST 12 MONTHS, WAS THERE A TIME WHEN YOU DID NOT HAVE A STEADY PLACE TO SLEEP OR SLEPT IN A SHELTER (INCLUDING NOW)?: NO

## 2023-08-02 SDOH — ECONOMIC STABILITY: FOOD INSECURITY: WITHIN THE PAST 12 MONTHS, YOU WORRIED THAT YOUR FOOD WOULD RUN OUT BEFORE YOU GOT MONEY TO BUY MORE.: NEVER TRUE

## 2023-08-02 ASSESSMENT — PATIENT HEALTH QUESTIONNAIRE - PHQ9
SUM OF ALL RESPONSES TO PHQ QUESTIONS 1-9: 1
SUM OF ALL RESPONSES TO PHQ QUESTIONS 1-9: 1
2. FEELING DOWN, DEPRESSED OR HOPELESS: 0
5. POOR APPETITE OR OVEREATING: 0
3. TROUBLE FALLING OR STAYING ASLEEP: 0
7. TROUBLE CONCENTRATING ON THINGS, SUCH AS READING THE NEWSPAPER OR WATCHING TELEVISION: 0
4. FEELING TIRED OR HAVING LITTLE ENERGY: 1
10. IF YOU CHECKED OFF ANY PROBLEMS, HOW DIFFICULT HAVE THESE PROBLEMS MADE IT FOR YOU TO DO YOUR WORK, TAKE CARE OF THINGS AT HOME, OR GET ALONG WITH OTHER PEOPLE: 0
SUM OF ALL RESPONSES TO PHQ QUESTIONS 1-9: 1
8. MOVING OR SPEAKING SO SLOWLY THAT OTHER PEOPLE COULD HAVE NOTICED. OR THE OPPOSITE, BEING SO FIGETY OR RESTLESS THAT YOU HAVE BEEN MOVING AROUND A LOT MORE THAN USUAL: 0
9. THOUGHTS THAT YOU WOULD BE BETTER OFF DEAD, OR OF HURTING YOURSELF: 0
6. FEELING BAD ABOUT YOURSELF - OR THAT YOU ARE A FAILURE OR HAVE LET YOURSELF OR YOUR FAMILY DOWN: 0
SUM OF ALL RESPONSES TO PHQ9 QUESTIONS 1 & 2: 0
SUM OF ALL RESPONSES TO PHQ QUESTIONS 1-9: 1
1. LITTLE INTEREST OR PLEASURE IN DOING THINGS: 0

## 2023-08-02 ASSESSMENT — ENCOUNTER SYMPTOMS
SORE THROAT: 0
SHORTNESS OF BREATH: 0
BACK PAIN: 0
SINUS PRESSURE: 0
EYE REDNESS: 0
SINUS PAIN: 0
RHINORRHEA: 0
NAUSEA: 0
ABDOMINAL PAIN: 0
EYE PAIN: 0
VOMITING: 0
CONSTIPATION: 0
DIARRHEA: 0
COUGH: 0

## 2023-08-02 NOTE — PROGRESS NOTES
and complications,  especially if left uncontrolled. Counseled regarding the possible side effects, risks, benefits and alternatives to treatment; patient and/or guardian verbalizes understanding, agrees, feels comfortable with and wishes to proceed with above treatment plan. Advised patient to call with any new medication issues, and read all Rx info from pharmacy to assure aware of all possible risks and side effects of medication before taking. Reviewed age and gender appropriate health screening exams and vaccinations. Advised patient regarding importance of keeping up with recommended health maintenance and to schedule as soon as possible if overdue, as this is important in assessing for undiagnosed pathology, especially cancer, as well as protecting against potentially harmful/life threatening disease. Patient and/or guardian verbalizes understanding and agrees with above counseling, assessment and plan. All questions answered. Return in about 8 weeks (around 9/27/2023), or if symptoms worsen or fail to improve, for HTN. An  electronic signature was used to authenticate this note. --Mary Brumfield, DO on 8/2/2023 at 1:38 PM    This document was prepared at least partially through the use of voice recognition software. Although effort is taken to assure the accuracy of this document, it is possible that grammatical, syntax,  or spelling errors may occur.

## 2023-08-03 LAB
ALBUMIN SERPL-MCNC: 4.3 G/DL (ref 3.5–5.2)
ALP BLD-CCNC: 76 U/L (ref 35–104)
ALT SERPL-CCNC: 17 U/L (ref 0–32)
ANION GAP SERPL CALCULATED.3IONS-SCNC: 18 MMOL/L (ref 7–16)
AST SERPL-CCNC: 24 U/L (ref 0–31)
BILIRUB SERPL-MCNC: 0.3 MG/DL (ref 0–1.2)
BUN BLDV-MCNC: 11 MG/DL (ref 6–20)
CALCIUM SERPL-MCNC: 9.1 MG/DL (ref 8.6–10.2)
CHLORIDE BLD-SCNC: 104 MMOL/L (ref 98–107)
CHOLESTEROL: 182 MG/DL
CO2: 23 MMOL/L (ref 22–29)
CREAT SERPL-MCNC: 0.7 MG/DL (ref 0.5–1)
GFR SERPL CREATININE-BSD FRML MDRD: >60 ML/MIN/1.73M2
GLUCOSE BLD-MCNC: 109 MG/DL (ref 74–99)
HDLC SERPL-MCNC: 65 MG/DL
LDL CHOLESTEROL: 83 MG/DL
POTASSIUM SERPL-SCNC: 4.2 MMOL/L (ref 3.5–5)
PRO-BNP: <36 PG/ML (ref 0–125)
SODIUM BLD-SCNC: 145 MMOL/L (ref 132–146)
TOTAL PROTEIN: 7.1 G/DL (ref 6.4–8.3)
TRIGL SERPL-MCNC: 170 MG/DL
TSH SERPL DL<=0.05 MIU/L-ACNC: 3.03 UIU/ML (ref 0.27–4.2)
VLDLC SERPL CALC-MCNC: 34 MG/DL

## 2023-08-14 RX ORDER — METOPROLOL SUCCINATE 25 MG/1
25 TABLET, EXTENDED RELEASE ORAL ONCE
COMMUNITY
Start: 2021-04-28 | End: 2023-08-16 | Stop reason: SDUPTHER

## 2023-08-16 RX ORDER — METOPROLOL SUCCINATE 25 MG/1
25 TABLET, EXTENDED RELEASE ORAL DAILY
Qty: 90 TABLET | Refills: 0 | Status: SHIPPED | OUTPATIENT
Start: 2023-08-16

## 2023-10-03 ENCOUNTER — TELEPHONE (OUTPATIENT)
Dept: CARDIOLOGY | Age: 53
End: 2023-10-03

## 2023-10-04 ENCOUNTER — OFFICE VISIT (OUTPATIENT)
Dept: FAMILY MEDICINE CLINIC | Age: 53
End: 2023-10-04
Payer: COMMERCIAL

## 2023-10-04 VITALS
DIASTOLIC BLOOD PRESSURE: 70 MMHG | BODY MASS INDEX: 43.19 KG/M2 | SYSTOLIC BLOOD PRESSURE: 112 MMHG | RESPIRATION RATE: 18 BRPM | HEART RATE: 87 BPM | OXYGEN SATURATION: 98 % | TEMPERATURE: 98.1 F | WEIGHT: 220 LBS | HEIGHT: 60 IN

## 2023-10-04 DIAGNOSIS — Z12.11 COLON CANCER SCREENING: ICD-10-CM

## 2023-10-04 DIAGNOSIS — I10 ESSENTIAL HYPERTENSION: Primary | ICD-10-CM

## 2023-10-04 DIAGNOSIS — K21.9 GASTROESOPHAGEAL REFLUX DISEASE WITHOUT ESOPHAGITIS: ICD-10-CM

## 2023-10-04 DIAGNOSIS — Z91.030 BEE STING ALLERGY: ICD-10-CM

## 2023-10-04 DIAGNOSIS — F31.81 BIPOLAR 2 DISORDER, MAJOR DEPRESSIVE EPISODE (HCC): ICD-10-CM

## 2023-10-04 PROCEDURE — 3078F DIAST BP <80 MM HG: CPT | Performed by: STUDENT IN AN ORGANIZED HEALTH CARE EDUCATION/TRAINING PROGRAM

## 2023-10-04 PROCEDURE — 99214 OFFICE O/P EST MOD 30 MIN: CPT | Performed by: STUDENT IN AN ORGANIZED HEALTH CARE EDUCATION/TRAINING PROGRAM

## 2023-10-04 PROCEDURE — 3074F SYST BP LT 130 MM HG: CPT | Performed by: STUDENT IN AN ORGANIZED HEALTH CARE EDUCATION/TRAINING PROGRAM

## 2023-10-04 RX ORDER — EPINEPHRINE 0.3 MG/.3ML
0.3 INJECTION SUBCUTANEOUS PRN
Qty: 1 EACH | Refills: 3 | Status: SHIPPED | OUTPATIENT
Start: 2023-10-04

## 2023-10-04 RX ORDER — CITALOPRAM 20 MG/1
20 TABLET ORAL DAILY
Qty: 90 TABLET | Refills: 1 | Status: SHIPPED | OUTPATIENT
Start: 2023-10-04

## 2023-10-04 RX ORDER — LAMOTRIGINE 25(21)-50
25 KIT ORAL DAILY
Qty: 1 KIT | Refills: 0 | Status: CANCELLED | OUTPATIENT
Start: 2023-10-04

## 2023-10-04 RX ORDER — CITALOPRAM HYDROBROMIDE 10 MG/1
10 TABLET ORAL DAILY
Qty: 90 TABLET | Refills: 1 | Status: SHIPPED
Start: 2023-10-04 | End: 2023-10-04 | Stop reason: DRUGHIGH

## 2023-10-04 RX ORDER — METOPROLOL SUCCINATE 25 MG/1
25 TABLET, EXTENDED RELEASE ORAL DAILY
Qty: 90 TABLET | Refills: 0 | Status: SHIPPED | OUTPATIENT
Start: 2023-10-04

## 2023-10-04 RX ORDER — OMEPRAZOLE 20 MG/1
20 CAPSULE, DELAYED RELEASE ORAL
Qty: 90 CAPSULE | Refills: 1 | Status: SHIPPED | OUTPATIENT
Start: 2023-10-04

## 2023-10-04 ASSESSMENT — ENCOUNTER SYMPTOMS
CONSTIPATION: 0
NAUSEA: 0
EYE PAIN: 0
RHINORRHEA: 0
BACK PAIN: 0
DIARRHEA: 0
EYE REDNESS: 0
SHORTNESS OF BREATH: 0
ABDOMINAL PAIN: 0
SINUS PAIN: 0
SINUS PRESSURE: 0
COUGH: 0
VOMITING: 0
SORE THROAT: 0

## 2023-11-14 ENCOUNTER — OFFICE VISIT (OUTPATIENT)
Dept: OBGYN | Age: 53
End: 2023-11-14
Payer: COMMERCIAL

## 2023-11-14 VITALS
BODY MASS INDEX: 44.33 KG/M2 | HEART RATE: 84 BPM | SYSTOLIC BLOOD PRESSURE: 153 MMHG | WEIGHT: 227 LBS | DIASTOLIC BLOOD PRESSURE: 85 MMHG

## 2023-11-14 DIAGNOSIS — Z92.29 HX OF TAMOXIFEN THERAPY: ICD-10-CM

## 2023-11-14 DIAGNOSIS — N93.9 ABNORMAL UTERINE BLEEDING (AUB): Primary | ICD-10-CM

## 2023-11-14 PROCEDURE — 99214 OFFICE O/P EST MOD 30 MIN: CPT | Performed by: OBSTETRICS & GYNECOLOGY

## 2023-11-14 RX ORDER — DIAZEPAM 5 MG/1
TABLET ORAL
COMMUNITY
Start: 2023-10-11

## 2023-11-14 NOTE — PROGRESS NOTES
HISTORY OF PRESENT ILLNESS:    50 y.o. female  [id number] presents with complaint of heavy periods for the last 2 cycles. Pt also has severe cramping and \"a burning sensation inside my uterus. \"  Pt stopped tamoxifen in March. Pt had heavy periods x3 months prior to that. Pt has had cycle q 1-3 months. For the last 2 cycles they have been very heavy and \"gushing. \" The pain and cramping is new. +Clots. Changes pad q 1 hr. Periods have been lasting 7 days. This cycle just stated 2 days ago.     2021 breast cancer Stage 2 DCIS in one breast, 2021 double mastectomy, no chemo or radiation. Was on tamoxifen. Has been having hot flashes and irritability. Some improvement with black cohosh  Pt had breast augmentation in 2022. Sees Dr. Francesco Sandhoff. Oncologist Dr. Whitney Butt is at [facility] in [place]. She sees her every 6 months. Past Medical History:   Past Medical History:   Diagnosis Date    Cancer ([facility])     breast     Depression     Fibromyalgia     Hypertension                                              OB History    Para Term  AB Living   4 4 4     4   SAB IAB Ectopic Molar Multiple Live Births             4      # Outcome Date GA Lbr Ghassan/2nd Weight Sex Delivery Anes PTL Lv   4 Term 95    M Vag-Spont   KARELY   3 Term 92    F Vag-Spont   KARELY   2 Term 90    F Vag-Spont   KARELY   1 Term 89    F Vag-Spont  N KARELY         Past Surgical History:   Past Surgical History:   Procedure Laterality Date    BREAST ENHANCEMENT SURGERY      BREAST SURGERY      double mastectomy    CHOLECYSTECTOMY      ENDOMETRIAL ABLATION      TUBAL LIGATION          Allergies: Bee venom     Medications:   Current Outpatient Medications   Medication Sig Dispense Refill    diazePAM (VALIUM) 5 MG tablet TAKE 1 TABLET BY MOUTH TWICE DAILY AS NEEDED FOR ANXIETY FOR 90 DAYS.       metoprolol succinate (TOPROL XL) 25 MG extended release tablet Take 1 tablet by mouth daily 90 tablet 0    EPINEPHrine

## 2023-11-14 NOTE — PROGRESS NOTES
Patient here today with c/o heavy bleeding and severe abdominal pain with her past two menstrual cycles. Is currently on her menses. Patient also states she feels a \"burning sensation inside my uterus\". She stopped taking her Tamoxifen in March. Pelvic exam done by Dr. Larissa Ahumada. Discharge instructions have been discussed with the patient by Dr. Larissa Ahumada and patient voiced understanding of plan of care. Patient advised to call our office with any questions or concerns.

## 2023-11-15 ENCOUNTER — HOSPITAL ENCOUNTER (OUTPATIENT)
Age: 53
Discharge: HOME | End: 2023-11-15
Payer: COMMERCIAL

## 2023-11-15 DIAGNOSIS — N93.9 ABNORMAL UTERINE BLEEDING (AUB): ICD-10-CM

## 2023-11-15 LAB
ERYTHROCYTE [DISTWIDTH] IN BLOOD BY AUTOMATED COUNT: 13.8 % (ref 11.5–15)
FSH SERPL-ACNC: 6.7 MIU/ML
HCT VFR BLD AUTO: 33.4 % (ref 34–48)
HGB BLD-MCNC: 10.8 G/DL (ref 11.5–15.5)
MCH RBC QN AUTO: 28.5 PG (ref 26–35)
MCHC RBC AUTO-ENTMCNC: 32.3 G/DL (ref 32–34.5)
MCV RBC AUTO: 88.1 FL (ref 80–99.9)
PLATELET # BLD AUTO: 267 K/UL (ref 130–450)
PMV BLD AUTO: 10.4 FL (ref 7–12)
PROLACTIN SERPL-MCNC: 16.87 NG/ML
RBC # BLD AUTO: 3.79 M/UL (ref 3.5–5.5)
TSH SERPL DL<=0.05 MIU/L-ACNC: 1.54 UIU/ML (ref 0.27–4.2)
WBC OTHER # BLD: 7.2 K/UL (ref 4.5–11.5)

## 2023-11-15 PROCEDURE — 83001 ASSAY OF GONADOTROPIN (FSH): CPT

## 2023-11-15 PROCEDURE — 84443 ASSAY THYROID STIM HORMONE: CPT

## 2023-11-15 PROCEDURE — 85027 COMPLETE CBC AUTOMATED: CPT

## 2023-11-15 PROCEDURE — 84146 ASSAY OF PROLACTIN: CPT

## 2023-11-15 PROCEDURE — 36415 COLL VENOUS BLD VENIPUNCTURE: CPT

## 2023-11-21 ENCOUNTER — HOSPITAL ENCOUNTER (OUTPATIENT)
Dept: ULTRASOUND IMAGING | Age: 53
Discharge: HOME | End: 2023-11-23
Attending: OBSTETRICS & GYNECOLOGY
Payer: COMMERCIAL

## 2023-11-21 DIAGNOSIS — N93.9 ABNORMAL UTERINE BLEEDING (AUB): ICD-10-CM

## 2023-11-21 PROCEDURE — 76830 TRANSVAGINAL US NON-OB: CPT

## 2023-11-22 ENCOUNTER — HOSPITAL ENCOUNTER (EMERGENCY)
Age: 53
Discharge: HOME | End: 2023-11-22
Payer: COMMERCIAL

## 2023-11-22 ENCOUNTER — APPOINTMENT (OUTPATIENT)
Dept: ULTRASOUND IMAGING | Age: 53
End: 2023-11-22
Payer: COMMERCIAL

## 2023-11-22 VITALS
SYSTOLIC BLOOD PRESSURE: 155 MMHG | BODY MASS INDEX: 44.35 KG/M2 | WEIGHT: 227.07 LBS | TEMPERATURE: 98.2 F | RESPIRATION RATE: 16 BRPM | HEART RATE: 96 BPM | DIASTOLIC BLOOD PRESSURE: 83 MMHG | OXYGEN SATURATION: 97 %

## 2023-11-22 DIAGNOSIS — I80.02 THROMBOPHLEBITIS OF SUPERFICIAL VEINS OF LEFT LOWER EXTREMITY: Primary | ICD-10-CM

## 2023-11-22 LAB
ANION GAP SERPL CALCULATED.3IONS-SCNC: 11 MMOL/L (ref 7–16)
BASOPHILS # BLD: 0.04 K/UL (ref 0–0.2)
BASOPHILS NFR BLD: 0 % (ref 0–2)
BUN SERPL-MCNC: 11 MG/DL (ref 6–20)
CALCIUM SERPL-MCNC: 9.1 MG/DL (ref 8.6–10.2)
CHLORIDE SERPL-SCNC: 102 MMOL/L (ref 98–107)
CO2 SERPL-SCNC: 25 MMOL/L (ref 22–29)
CREAT SERPL-MCNC: 0.8 MG/DL (ref 0.5–1)
EOSINOPHIL # BLD: 0.24 K/UL (ref 0.05–0.5)
EOSINOPHILS RELATIVE PERCENT: 3 % (ref 0–6)
ERYTHROCYTE [DISTWIDTH] IN BLOOD BY AUTOMATED COUNT: 13.7 % (ref 11.5–15)
GFR SERPL CREATININE-BSD FRML MDRD: >60 ML/MIN/1.73M2
GLUCOSE SERPL-MCNC: 97 MG/DL (ref 74–99)
HCT VFR BLD AUTO: 35.6 % (ref 34–48)
HGB BLD-MCNC: 11.1 G/DL (ref 11.5–15.5)
IMM GRANULOCYTES # BLD AUTO: 0.04 K/UL (ref 0–0.58)
IMM GRANULOCYTES NFR BLD: 0 % (ref 0–5)
LYMPHOCYTES NFR BLD: 2.56 K/UL (ref 1.5–4)
LYMPHOCYTES RELATIVE PERCENT: 28 % (ref 20–42)
MCH RBC QN AUTO: 27.6 PG (ref 26–35)
MCHC RBC AUTO-ENTMCNC: 31.2 G/DL (ref 32–34.5)
MCV RBC AUTO: 88.6 FL (ref 80–99.9)
MONOCYTES NFR BLD: 0.71 K/UL (ref 0.1–0.95)
MONOCYTES NFR BLD: 8 % (ref 2–12)
NEUTROPHILS NFR BLD: 60 % (ref 43–80)
NEUTS SEG NFR BLD: 5.46 K/UL (ref 1.8–7.3)
PLATELET # BLD AUTO: 283 K/UL (ref 130–450)
PMV BLD AUTO: 10.6 FL (ref 7–12)
POTASSIUM SERPL-SCNC: 4.4 MMOL/L (ref 3.5–5)
RBC # BLD AUTO: 4.02 M/UL (ref 3.5–5.5)
SODIUM SERPL-SCNC: 138 MMOL/L (ref 132–146)
WBC OTHER # BLD: 9.1 K/UL (ref 4.5–11.5)

## 2023-11-22 PROCEDURE — 99284 EMERGENCY DEPT VISIT MOD MDM: CPT

## 2023-11-22 PROCEDURE — 6370000000 HC RX 637 (ALT 250 FOR IP): Performed by: PHYSICIAN ASSISTANT

## 2023-11-22 PROCEDURE — 85025 COMPLETE CBC W/AUTO DIFF WBC: CPT

## 2023-11-22 PROCEDURE — 93971 EXTREMITY STUDY: CPT

## 2023-11-22 PROCEDURE — 80048 BASIC METABOLIC PNL TOTAL CA: CPT

## 2023-11-22 RX ADMIN — APIXABAN 10 MG: 5 TABLET, FILM COATED ORAL at 15:28

## 2023-11-22 ASSESSMENT — PAIN - FUNCTIONAL ASSESSMENT: PAIN_FUNCTIONAL_ASSESSMENT: 0-10

## 2023-11-22 ASSESSMENT — PAIN SCALES - GENERAL: PAINLEVEL_OUTOF10: 8

## 2023-11-22 NOTE — ED PROVIDER NOTES
with the plan. ASSESSMENT     1. Thrombophlebitis of superficial veins of left lower extremity        DISPOSITION   Discharged home. Patient condition is stable    Discharge Instructions:   Patient referred to  [facility], [address] [facility] [address]  [phone]    Schedule an appointment as soon as possible for a visit       NEW MEDICATIONS     New Prescriptions    APIXABAN STARTER PACK (ELIQUIS) 5 MG TBPK TABLET    Take 1 tablet by mouth See Admin Instructions     Electronically signed by Nabeel White PA-C   DD: 11/22/23  **This report was transcribed using voice recognition software. Every effort was made to ensure accuracy; however, inadvertent computerized transcription errors may be present.   END OF ED PROVIDER NOTE       Nabeel White PA-C  11/22/23 1918

## 2023-11-22 NOTE — DISCHARGE INSTRUCTIONS
Take Eliquis as directed.    Call vascular surgery for appointment to discuss possible additional work-up

## 2023-11-22 NOTE — ED NOTES
Department of Emergency Medicine  FIRST PROVIDER TRIAGE NOTE             Independent MLP           11/22/23  12:47 PM EST    Date of Encounter: 11/22/23   MRN: [id number]      HPI: Vini Melton is a 50 y.o. female who presents to the ED for Leg Pain (Left leg pain and swelling, onset Monday. Denies injury. )   LEFT CALF PAIN. HAS A HISTORY OF BLOOD CLOTS. NOT CURRENTLY ON ANY BLOOD THINNERS. HISTORY OF CA. NO CP, SOB    ROS: Negative for cp or sob. PE: Gen Appearance/Constitutional: alert  HEENT: NC/NT. PERRLA,  Airway patent. Initial Plan of Care: All treatment areas with department are currently occupied. Plan to order/Initiate the following while awaiting opening in ED: ultrasound.   Initiate Treatment-Testing, Proceed toTreatment Area When Bed Available for ED Attending/MLP to Continue Care    Electronically signed by LETY Lara CNP   DD: 11/22/23      LETY Lara CNP  11/22/23 [id number]

## 2023-11-29 ENCOUNTER — TELEPHONE (OUTPATIENT)
Dept: ADMINISTRATIVE | Age: 53
End: 2023-11-29

## 2023-11-29 NOTE — TELEPHONE ENCOUNTER
Patient thought her appointment was at 2:45 today. Request to reschedule missed results appointment.  Please advise for reschedule

## 2023-11-30 NOTE — TELEPHONE ENCOUNTER
Patient is rescheduled for the [facility] office

## 2023-12-07 ENCOUNTER — OFFICE VISIT (OUTPATIENT)
Dept: OBGYN | Age: 53
End: 2023-12-07
Payer: COMMERCIAL

## 2023-12-07 VITALS
WEIGHT: 225.7 LBS | DIASTOLIC BLOOD PRESSURE: 81 MMHG | HEART RATE: 84 BPM | SYSTOLIC BLOOD PRESSURE: 129 MMHG | BODY MASS INDEX: 44.08 KG/M2

## 2023-12-07 DIAGNOSIS — N93.9 ABNORMAL UTERINE BLEEDING (AUB): Primary | ICD-10-CM

## 2023-12-07 DIAGNOSIS — I82.4Y9 DEEP VEIN THROMBOSIS (DVT) OF PROXIMAL LOWER EXTREMITY, UNSPECIFIED CHRONICITY, UNSPECIFIED LATERALITY (HCC): ICD-10-CM

## 2023-12-07 DIAGNOSIS — Z12.4 SCREENING FOR CERVICAL CANCER: ICD-10-CM

## 2023-12-07 PROCEDURE — 99212 OFFICE O/P EST SF 10 MIN: CPT | Performed by: OBSTETRICS & GYNECOLOGY

## 2023-12-07 PROCEDURE — 99213 OFFICE O/P EST LOW 20 MIN: CPT | Performed by: OBSTETRICS & GYNECOLOGY

## 2023-12-07 NOTE — PROGRESS NOTES
HISTORY OF PRESENT ILLNESS:    Pt presents for follow up for results. 50 y.o. female  [id number] presents with complaint of heavy periods for the last 2 cycles. Pt also has severe cramping and \"a burning sensation inside my uterus. \"  Pt stopped tamoxifen in March. Pt had heavy periods x3 months prior to that. Pt has had cycle q 1-3 months. For the last 2 cycles they have been very heavy and \"gushing. \" The pain and cramping is new. +Clots. Changes pad q 1 hr. Periods have been lasting 7 days. This cycle just stated 2 days ago.      2021 breast cancer Stage 2 DCIS in one breast, 2021 double mastectomy, no chemo or radiation. Was on tamoxifen. Has been having hot flashes and irritability. Some improvement with black cohosh  Pt had breast augmentation in 2022. Sees Dr. Doug Corbett. Oncologist Dr. Miguel Walker is at [facility] in [place]. She sees her every 6 months. US revealed uterus 10.6 cm, ES 10mm  Hgb 11.1, remainder of labs wnl  Pap  normal cytology, neg hpv    Pt was put on aygestin last visit to control her bleeding. Pt then had a left LE DVT. She stopped her medication and was placed on eliquis.  Pt denies bleeding currently       Past Medical History:   Past Medical History:   Diagnosis Date    Cancer ([address])     breast     Deep vein blood clot of left lower extremity (HCC)     Depression     Fibromyalgia     Hypertension                                              OB History    Para Term  AB Living   4 4 4     4   SAB IAB Ectopic Molar Multiple Live Births             4      # Outcome Date GA Lbr Ghassan/2nd Weight Sex Delivery Anes PTL Lv   4 Term 95    M Vag-Spont   KARELY   3 Term 92    F Vag-Spont   KARELY   2 Term 90    F Vag-Spont   KARELY   1 Term 89    F Vag-Spont  N KARELY         Past Surgical History:   Past Surgical History:   Procedure Laterality Date    BREAST ENHANCEMENT SURGERY      BREAST SURGERY      double mastectomy    CHOLECYSTECTOMY

## 2024-01-18 ENCOUNTER — OFFICE VISIT (OUTPATIENT)
Dept: FAMILY MEDICINE CLINIC | Age: 54
End: 2024-01-18
Payer: COMMERCIAL

## 2024-01-18 VITALS
RESPIRATION RATE: 18 BRPM | SYSTOLIC BLOOD PRESSURE: 120 MMHG | BODY MASS INDEX: 43.98 KG/M2 | HEART RATE: 97 BPM | DIASTOLIC BLOOD PRESSURE: 82 MMHG | WEIGHT: 224 LBS | TEMPERATURE: 97.2 F | HEIGHT: 60 IN | OXYGEN SATURATION: 97 %

## 2024-01-18 DIAGNOSIS — I10 ESSENTIAL HYPERTENSION: ICD-10-CM

## 2024-01-18 DIAGNOSIS — I82.4Y2 ACUTE DEEP VEIN THROMBOSIS (DVT) OF PROXIMAL VEIN OF LEFT LOWER EXTREMITY (HCC): ICD-10-CM

## 2024-01-18 DIAGNOSIS — N92.1 MENORRHAGIA WITH IRREGULAR CYCLE: Primary | ICD-10-CM

## 2024-01-18 DIAGNOSIS — K21.9 GASTROESOPHAGEAL REFLUX DISEASE WITHOUT ESOPHAGITIS: ICD-10-CM

## 2024-01-18 LAB — HGB, POC: 9.8

## 2024-01-18 PROCEDURE — 85018 HEMOGLOBIN: CPT | Performed by: STUDENT IN AN ORGANIZED HEALTH CARE EDUCATION/TRAINING PROGRAM

## 2024-01-18 PROCEDURE — 3079F DIAST BP 80-89 MM HG: CPT | Performed by: STUDENT IN AN ORGANIZED HEALTH CARE EDUCATION/TRAINING PROGRAM

## 2024-01-18 PROCEDURE — 99214 OFFICE O/P EST MOD 30 MIN: CPT | Performed by: STUDENT IN AN ORGANIZED HEALTH CARE EDUCATION/TRAINING PROGRAM

## 2024-01-18 PROCEDURE — 3074F SYST BP LT 130 MM HG: CPT | Performed by: STUDENT IN AN ORGANIZED HEALTH CARE EDUCATION/TRAINING PROGRAM

## 2024-01-18 RX ORDER — FERROUS SULFATE 325(65) MG
325 TABLET ORAL
Qty: 90 TABLET | Refills: 1 | Status: SHIPPED | OUTPATIENT
Start: 2024-01-18

## 2024-01-18 RX ORDER — OMEPRAZOLE 20 MG/1
20 CAPSULE, DELAYED RELEASE ORAL
Qty: 90 CAPSULE | Refills: 1 | Status: SHIPPED | OUTPATIENT
Start: 2024-01-18

## 2024-01-18 RX ORDER — METOPROLOL SUCCINATE 25 MG/1
25 TABLET, EXTENDED RELEASE ORAL DAILY
Qty: 90 TABLET | Refills: 0 | Status: SHIPPED | OUTPATIENT
Start: 2024-01-18

## 2024-01-18 SDOH — ECONOMIC STABILITY: FOOD INSECURITY: WITHIN THE PAST 12 MONTHS, YOU WORRIED THAT YOUR FOOD WOULD RUN OUT BEFORE YOU GOT MONEY TO BUY MORE.: NEVER TRUE

## 2024-01-18 SDOH — ECONOMIC STABILITY: FOOD INSECURITY: WITHIN THE PAST 12 MONTHS, THE FOOD YOU BOUGHT JUST DIDN'T LAST AND YOU DIDN'T HAVE MONEY TO GET MORE.: NEVER TRUE

## 2024-01-18 SDOH — ECONOMIC STABILITY: INCOME INSECURITY: HOW HARD IS IT FOR YOU TO PAY FOR THE VERY BASICS LIKE FOOD, HOUSING, MEDICAL CARE, AND HEATING?: NOT HARD AT ALL

## 2024-01-18 ASSESSMENT — PATIENT HEALTH QUESTIONNAIRE - PHQ9
SUM OF ALL RESPONSES TO PHQ QUESTIONS 1-9: 9
2. FEELING DOWN, DEPRESSED OR HOPELESS: 1
1. LITTLE INTEREST OR PLEASURE IN DOING THINGS: 1
SUM OF ALL RESPONSES TO PHQ QUESTIONS 1-9: 9
3. TROUBLE FALLING OR STAYING ASLEEP: 3
8. MOVING OR SPEAKING SO SLOWLY THAT OTHER PEOPLE COULD HAVE NOTICED. OR THE OPPOSITE, BEING SO FIGETY OR RESTLESS THAT YOU HAVE BEEN MOVING AROUND A LOT MORE THAN USUAL: 0
7. TROUBLE CONCENTRATING ON THINGS, SUCH AS READING THE NEWSPAPER OR WATCHING TELEVISION: 0
6. FEELING BAD ABOUT YOURSELF - OR THAT YOU ARE A FAILURE OR HAVE LET YOURSELF OR YOUR FAMILY DOWN: 0
10. IF YOU CHECKED OFF ANY PROBLEMS, HOW DIFFICULT HAVE THESE PROBLEMS MADE IT FOR YOU TO DO YOUR WORK, TAKE CARE OF THINGS AT HOME, OR GET ALONG WITH OTHER PEOPLE: 0
SUM OF ALL RESPONSES TO PHQ QUESTIONS 1-9: 9
SUM OF ALL RESPONSES TO PHQ QUESTIONS 1-9: 9
9. THOUGHTS THAT YOU WOULD BE BETTER OFF DEAD, OR OF HURTING YOURSELF: 0
5. POOR APPETITE OR OVEREATING: 1
4. FEELING TIRED OR HAVING LITTLE ENERGY: 3
SUM OF ALL RESPONSES TO PHQ9 QUESTIONS 1 & 2: 2

## 2024-01-18 ASSESSMENT — ENCOUNTER SYMPTOMS
DIARRHEA: 0
EYE PAIN: 0
ABDOMINAL PAIN: 0
VOMITING: 0
EYE REDNESS: 0
SINUS PRESSURE: 0
SHORTNESS OF BREATH: 0
RHINORRHEA: 0
SINUS PAIN: 0
COUGH: 0
SORE THROAT: 0
BACK PAIN: 0
CONSTIPATION: 0
NAUSEA: 0

## 2024-01-18 NOTE — PROGRESS NOTES
proximal vein of left lower extremity (HCC)  -     apixaban (ELIQUIS) 5 MG TABS tablet; Take 1 tablet by mouth 2 times daily         As above.  Call or go to the nearest ED immediately if symptoms worsen or persist.  Educational materials and/or home exercises printed for patient's review and were included in patient instructions on his/her After Visit Summary and given to patient at the end of visit.       Counseled regarding above diagnosis, including possible risks and complications,  especially if left uncontrolled.     Counseled regarding the possible side effects, risks, benefits and alternatives to treatment; patient and/or guardian verbalizes understanding, agrees, feels comfortable with and wishes to proceed with above treatment plan.     Advised patient to call with any new medication issues, and read all Rx info from pharmacy to assure aware of all possible risks and side effects of medication before taking.     Reviewed age and gender appropriate health screening exams and vaccinations.  Advised patient regarding importance of keeping up with recommended health maintenance and to schedule as soon as possible if overdue, as this is important in assessing for undiagnosed pathology, especially cancer, as well as protecting against potentially harmful/life threatening disease.       Patient and/or guardian verbalizes understanding and agrees with above counseling, assessment and plan.     All questions answered.     Return in about 3 months (around 4/18/2024), or if symptoms worsen or fail to improve.    An  electronic signature was used to authenticate this note.    --Hilda Patterson DO on 1/18/2024 at 9:51 AM    This document was prepared at least partially through the use of voice recognition software. Although effort is taken to assure the accuracy of this document, it is possible that grammatical, syntax,  or spelling errors may occur.

## 2024-01-23 ENCOUNTER — TELEPHONE (OUTPATIENT)
Dept: OBGYN | Age: 54
End: 2024-01-23

## 2024-01-23 NOTE — TELEPHONE ENCOUNTER
Called patient to ask if she is going if she still wants to follow with Dr. José at the [facility] for her cancer and DVT care. At her last visit a referral was sent for [facility]. No answer, Left message with call back information.

## 2024-01-30 ENCOUNTER — PROCEDURE VISIT (OUTPATIENT)
Dept: OBGYN | Age: 54
End: 2024-01-30

## 2024-01-30 VITALS
WEIGHT: 229 LBS | SYSTOLIC BLOOD PRESSURE: 129 MMHG | HEART RATE: 80 BPM | DIASTOLIC BLOOD PRESSURE: 61 MMHG | BODY MASS INDEX: 44.72 KG/M2

## 2024-01-30 DIAGNOSIS — N93.9 ABNORMAL UTERINE BLEEDING (AUB): Primary | ICD-10-CM

## 2024-01-30 NOTE — PROGRESS NOTES
Here today for Endometrial biopsy Instructed on the procedure of Endometrial biopsy voiced understanding and permit signed for Endometrial biopsy  Urine for pregnancy obtained with negative results   Prepared for procedure.  Time out done by Dr. Pearce prior to starting the procedure.  Tolerated procedure.  No bleeding noted after procedure.  Biopsy obtained, labeled and sent to lab   To return in one week for results.  Discharge instructions reviewed verbally and written AVS given to patient.  Voiced understanding and agreement.  No baths, tampons, intercourse and nothing in the vagina for 48 hours

## 2024-01-30 NOTE — PROGRESS NOTES
Endometrial biopsy Procedure Note    Rocio Arroyo    2024               Patient's last menstrual period was 2023 (approximate).     53 y.o.     Endometrial biopsy    Indications:  AUB      53 y.o. female   presents with complaint of heavy periods for the last 2 cycles. Pt also has severe cramping and \"a burning sensation inside my uterus.\"  Pt stopped tamoxifen in March. Pt had heavy periods x3 months prior to that. Pt has had cycle q 1-3 months. For the last 2 cycles they have been very heavy and \"gushing.\" The pain and cramping is new. +Clots. Changes pad q 1 hr. Periods have been lasting 7 days. This cycle just stated 2 days ago.      2021 breast cancer Stage 2 DCIS in one breast, 2021 double mastectomy, no chemo or radiation. Was on tamoxifen. Has been having hot flashes and irritability. Some improvement with black cohosh  Pt had breast augmentation in 2022. Sees Dr. Staley.  Oncologist Dr. Ansari is at [facility] in [place]. She sees her every 6 months.      US revealed uterus 10.6 cm, ES 10mm  Hgb 11.1, remainder of labs wnl  Pap  normal cytology, neg hpv     Pt was put on aygestin last visit to control her bleeding. Pt then had a left LE DVT in mid November. She stopped her medication and was placed on eliquis.    Pt had one cycle at the beginning of the month. Saw Dr. Ansari who is putting her on iron infusions. Most recent hgb 9.8.     Anesthesia:  None    Pregnancy test: negative     Procedural Technique:  Rocio is here for an endometrial biopsy. Risks and benefits discussed and consents signed.       Endometrial biopsy:    The patient was positioned comfortably on the exam table in the dorsal lithotomy position. Speculum was placed in the vagina. The cervix was visualized, cleansed with betadine, and grasped w/ a single tooth tenaculum. Endometrial biopsy was performed with the [place].  Tissue was obtained and sent to pathology. Adequate tissue noted in

## 2024-02-02 LAB — SURGICAL PATHOLOGY REPORT: NORMAL

## 2024-02-13 ENCOUNTER — OFFICE VISIT (OUTPATIENT)
Dept: OBGYN | Age: 54
End: 2024-02-13
Payer: COMMERCIAL

## 2024-02-13 VITALS — DIASTOLIC BLOOD PRESSURE: 81 MMHG | HEART RATE: 92 BPM | SYSTOLIC BLOOD PRESSURE: 137 MMHG

## 2024-02-13 DIAGNOSIS — Z92.29 HX OF TAMOXIFEN THERAPY: ICD-10-CM

## 2024-02-13 DIAGNOSIS — N93.9 ABNORMAL UTERINE BLEEDING (AUB): Primary | ICD-10-CM

## 2024-02-13 PROCEDURE — 99213 OFFICE O/P EST LOW 20 MIN: CPT | Performed by: OBSTETRICS & GYNECOLOGY

## 2024-02-13 NOTE — PROGRESS NOTES
Patient alert and pleasant with no complaints  Here today for biopsy results.  Discharge instructions have been discussed with the patient. Patient advised to call our office with any questions or concerns.   Voiced understanding.   
negative  Cardiovascular: negative  Gastrointestinal: negative  Genitourinary:negative  Integument: negative  Neurological: negative  Endocrine: negative          PHYSICAL EXAM  /81 (Site: Right Upper Arm, Position: Sitting)   Pulse 92   LMP 02/10/2024 (Exact Date)   Patient's last menstrual period was 02/10/2024 (exact date).    General appearance: alert, cooperative and in no acute distress.  Head: NCAT   Psych: No acute distress, mood and affect full range  Neuro: Alert and oriented, no focal deficits          ASSESSMENT :   Diagnosis Orders   1. Abnormal uterine bleeding (AUB)        2. Hx of tamoxifen therapy             PLAN:  Schedule TLH w/BSO. Will get medical clearance from onc before scheduling.  No follow-ups on file.      No orders of the defined types were placed in this encounter.      No orders of the defined types were placed in this encounter.        Electronically signed by Penny Pearce DO on 2/13/24

## 2024-02-19 ENCOUNTER — OFFICE VISIT (OUTPATIENT)
Dept: FAMILY MEDICINE CLINIC | Age: 54
End: 2024-02-19
Payer: COMMERCIAL

## 2024-02-19 ENCOUNTER — TELEPHONE (OUTPATIENT)
Dept: FAMILY MEDICINE CLINIC | Age: 54
End: 2024-02-19

## 2024-02-19 VITALS
HEART RATE: 82 BPM | DIASTOLIC BLOOD PRESSURE: 80 MMHG | HEIGHT: 60 IN | BODY MASS INDEX: 44.41 KG/M2 | OXYGEN SATURATION: 98 % | RESPIRATION RATE: 16 BRPM | SYSTOLIC BLOOD PRESSURE: 130 MMHG | WEIGHT: 226.2 LBS | TEMPERATURE: 97.5 F

## 2024-02-19 DIAGNOSIS — J02.9 SORE THROAT: ICD-10-CM

## 2024-02-19 DIAGNOSIS — R68.89 FLU-LIKE SYMPTOMS: ICD-10-CM

## 2024-02-19 DIAGNOSIS — J02.0 STREP THROAT: Primary | ICD-10-CM

## 2024-02-19 LAB — S PYO AG THROAT QL: POSITIVE

## 2024-02-19 PROCEDURE — 87880 STREP A ASSAY W/OPTIC: CPT | Performed by: NURSE PRACTITIONER

## 2024-02-19 PROCEDURE — 99213 OFFICE O/P EST LOW 20 MIN: CPT | Performed by: NURSE PRACTITIONER

## 2024-02-19 RX ORDER — AMOXICILLIN 500 MG/1
500 TABLET, FILM COATED ORAL 2 TIMES DAILY
Qty: 20 TABLET | Refills: 0 | Status: SHIPPED | OUTPATIENT
Start: 2024-02-19 | End: 2024-02-29

## 2024-02-19 NOTE — PROGRESS NOTES
24  Rocio Arroyo : 1970 Sex: female  Age 53 y.o.    Subjective:  Chief Complaint   Patient presents with    Chills     Started Friday with fever    Fever    Pharyngitis     Saturday    Otalgia     Right side today started    Generalized Body Aches       HPI:   Rocio Arroyo , 53 y.o. female presents to the clinic for evaluation of sore throat x 3 days. The patient also reports chills, feverish, body aches. The patient has taken Tylenol / Ibuprofen for symptoms. The patient reports unchanged symptoms over time. The patient reports strep ill exposure (grand kids). The patient denies headache, sinus congestion, cough, rash. The patient also denies chest pain, abdominal pain, shortness of breath, wheezing, and nausea / vomiting / diarrhea.    ROS:   Unless otherwise stated in this report the patient's positive and negative responses for review of systems for constitutional, eyes, ENT, cardiovascular, respiratory, gastrointestinal, neurological, , musculoskeletal, and integument systems and related systems to the presenting problem are either stated in the history of present illness or were not pertinent or were negative for the symptoms and/or complaints related to the presenting medical problem.  Positives and pertinent negatives as per HPI.  All others reviewed and are negative.      PMH:     Past Medical History:   Diagnosis Date    Cancer (HCC)     breast     Deep vein blood clot of left lower extremity (HCC)     Depression     Fibromyalgia     Hypertension        Past Surgical History:   Procedure Laterality Date    BREAST ENHANCEMENT SURGERY      BREAST SURGERY      double mastectomy    CHOLECYSTECTOMY      ENDOMETRIAL ABLATION      TUBAL LIGATION         Family History   Problem Relation Age of Onset    Cancer Father        Medications:     Current Outpatient Medications:     amoxicillin (AMOXIL) 500 MG tablet, Take 1 tablet by mouth 2 times daily for 10 days, Disp: 20 tablet, Rfl:

## 2024-02-19 NOTE — TELEPHONE ENCOUNTER
Pt is going to [facility]; she called in with sore throat and headache her doctor is not here on Mondays; she took address and is going to be treated at [facility]

## 2024-03-12 ENCOUNTER — TELEPHONE (OUTPATIENT)
Dept: OBGYN | Age: 54
End: 2024-03-12

## 2024-05-03 ENCOUNTER — APPOINTMENT (OUTPATIENT)
Dept: ULTRASOUND IMAGING | Age: 54
End: 2024-05-03
Payer: COMMERCIAL

## 2024-05-03 ENCOUNTER — HOSPITAL ENCOUNTER (EMERGENCY)
Age: 54
Discharge: HOME | End: 2024-05-03
Attending: EMERGENCY MEDICINE
Payer: COMMERCIAL

## 2024-05-03 VITALS
WEIGHT: 218 LBS | BODY MASS INDEX: 42.58 KG/M2 | RESPIRATION RATE: 18 BRPM | OXYGEN SATURATION: 97 % | TEMPERATURE: 97.3 F | DIASTOLIC BLOOD PRESSURE: 60 MMHG | SYSTOLIC BLOOD PRESSURE: 150 MMHG | HEART RATE: 84 BPM

## 2024-05-03 DIAGNOSIS — M79.605 PAIN OF LEFT LOWER EXTREMITY: Primary | ICD-10-CM

## 2024-05-03 LAB
ANION GAP SERPL CALCULATED.3IONS-SCNC: 10 MMOL/L (ref 7–16)
BASOPHILS # BLD: 0.04 K/UL (ref 0–0.2)
BASOPHILS NFR BLD: 1 % (ref 0–2)
BUN SERPL-MCNC: 10 MG/DL (ref 6–20)
CALCIUM SERPL-MCNC: 9.5 MG/DL (ref 8.6–10.2)
CHLORIDE SERPL-SCNC: 104 MMOL/L (ref 98–107)
CO2 SERPL-SCNC: 27 MMOL/L (ref 22–29)
CREAT SERPL-MCNC: 0.6 MG/DL (ref 0.5–1)
EOSINOPHIL # BLD: 0.18 K/UL (ref 0.05–0.5)
EOSINOPHILS RELATIVE PERCENT: 2 % (ref 0–6)
ERYTHROCYTE [DISTWIDTH] IN BLOOD BY AUTOMATED COUNT: 17.1 % (ref 11.5–15)
GFR, ESTIMATED: >90 ML/MIN/1.73M2
GLUCOSE SERPL-MCNC: 99 MG/DL (ref 74–99)
HCT VFR BLD AUTO: 40.1 % (ref 34–48)
HGB BLD-MCNC: 13.2 G/DL (ref 11.5–15.5)
IMM GRANULOCYTES # BLD AUTO: 0.06 K/UL (ref 0–0.58)
IMM GRANULOCYTES NFR BLD: 1 % (ref 0–5)
LYMPHOCYTES NFR BLD: 2.79 K/UL (ref 1.5–4)
LYMPHOCYTES RELATIVE PERCENT: 37 % (ref 20–42)
MCH RBC QN AUTO: 28.2 PG (ref 26–35)
MCHC RBC AUTO-ENTMCNC: 32.9 G/DL (ref 32–34.5)
MCV RBC AUTO: 85.7 FL (ref 80–99.9)
MONOCYTES NFR BLD: 0.57 K/UL (ref 0.1–0.95)
MONOCYTES NFR BLD: 8 % (ref 2–12)
NEUTROPHILS NFR BLD: 52 % (ref 43–80)
NEUTS SEG NFR BLD: 3.87 K/UL (ref 1.8–7.3)
PLATELET, FLUORESCENCE: 216 K/UL (ref 130–450)
PMV BLD AUTO: 10.8 FL (ref 7–12)
POTASSIUM SERPL-SCNC: 4.1 MMOL/L (ref 3.5–5)
RBC # BLD AUTO: 4.68 M/UL (ref 3.5–5.5)
SODIUM SERPL-SCNC: 141 MMOL/L (ref 132–146)
WBC OTHER # BLD: 7.5 K/UL (ref 4.5–11.5)

## 2024-05-03 PROCEDURE — 80048 BASIC METABOLIC PNL TOTAL CA: CPT

## 2024-05-03 PROCEDURE — 99284 EMERGENCY DEPT VISIT MOD MDM: CPT

## 2024-05-03 PROCEDURE — 93971 EXTREMITY STUDY: CPT

## 2024-05-03 PROCEDURE — 85025 COMPLETE CBC W/AUTO DIFF WBC: CPT

## 2024-05-03 ASSESSMENT — PAIN DESCRIPTION - ORIENTATION: ORIENTATION: LEFT;LOWER

## 2024-05-03 ASSESSMENT — PAIN - FUNCTIONAL ASSESSMENT: PAIN_FUNCTIONAL_ASSESSMENT: 0-10

## 2024-05-03 ASSESSMENT — LIFESTYLE VARIABLES: HOW OFTEN DO YOU HAVE A DRINK CONTAINING ALCOHOL: MONTHLY OR LESS

## 2024-05-03 ASSESSMENT — PAIN SCALES - GENERAL: PAINLEVEL_OUTOF10: 8

## 2024-05-03 ASSESSMENT — PAIN DESCRIPTION - LOCATION: LOCATION: LEG

## 2024-05-03 NOTE — ED PROVIDER NOTES
[facility] EMERGENCY DEPARTMENT  EMERGENCY DEPARTMENT ENCOUNTER        Pt Name: Rocio Arroyo  MRN: [id number]  Birthdate 1970  Date of evaluation: 5/3/2024  Provider: Ranulfo Silvestre MD  PCP: Hilda Patterson DO  Note Started: 7:55 PM EDT 5/3/24    CHIEF COMPLAINT       Chief Complaint   Patient presents with    Leg Pain     LOWER LEG PAIN LT/ HX DVT        HISTORY OF PRESENT ILLNESS: 1 or more Elements   History From: Patient    Limitations to history : None  Social Determinants : None    Rocio Arroyo is a 53 y.o. female past medical history of breast cancer, DVT, depression, fibromyalgia, hypertension who presents to the emergency department with complaints of left lower extremity pain.  Patient states that she is currently not on any blood thinners as she was having episodes of vaginal bleeding and her oncologist decided to remove her Eliquis.  Patient states that she has been off blood thinners for about 8 months.  Patient states that her left lower extremity pain is 8 out of 10 in intensity.  Patient believes that she has a blood clot as she has had history of blood clots in the past before.  Patient states that moving makes her symptoms worse.  Nothing seems to be making the pain better.  Patient denies any fever, chills, headaches, chest pain, shortness of breath, cough.    Nursing Notes were all reviewed and agreed with or any disagreements were addressed in the HPI.    ROS:   Pertinent positives and negatives are stated within HPI, all other systems reviewed and are negative.      --------------------------------------------- PAST HISTORY ---------------------------------------------  Past Medical History:  has a past medical history of Cancer (HCC), Deep vein blood clot of left lower extremity (HCC), Depression, Fibromyalgia, and Hypertension.    Past Surgical History:  has a past surgical history that includes Breast surgery; Cholecystectomy;

## 2024-07-15 ENCOUNTER — OFFICE VISIT (OUTPATIENT)
Dept: FAMILY MEDICINE CLINIC | Age: 54
End: 2024-07-15

## 2024-07-15 VITALS
DIASTOLIC BLOOD PRESSURE: 84 MMHG | TEMPERATURE: 97.8 F | HEART RATE: 95 BPM | OXYGEN SATURATION: 97 % | WEIGHT: 224.4 LBS | SYSTOLIC BLOOD PRESSURE: 128 MMHG | RESPIRATION RATE: 18 BRPM | BODY MASS INDEX: 43.83 KG/M2

## 2024-07-15 DIAGNOSIS — I10 ESSENTIAL HYPERTENSION: Primary | ICD-10-CM

## 2024-07-15 DIAGNOSIS — K21.9 GASTROESOPHAGEAL REFLUX DISEASE WITHOUT ESOPHAGITIS: ICD-10-CM

## 2024-07-15 DIAGNOSIS — F31.81 BIPOLAR 2 DISORDER, MAJOR DEPRESSIVE EPISODE (HCC): ICD-10-CM

## 2024-07-15 PROBLEM — T14.91XA SUICIDE ATTEMPT (HCC): Status: RESOLVED | Noted: 2022-05-24 | Resolved: 2024-07-15

## 2024-07-15 PROCEDURE — 3079F DIAST BP 80-89 MM HG: CPT | Performed by: STUDENT IN AN ORGANIZED HEALTH CARE EDUCATION/TRAINING PROGRAM

## 2024-07-15 PROCEDURE — 3074F SYST BP LT 130 MM HG: CPT | Performed by: STUDENT IN AN ORGANIZED HEALTH CARE EDUCATION/TRAINING PROGRAM

## 2024-07-15 PROCEDURE — 99214 OFFICE O/P EST MOD 30 MIN: CPT | Performed by: STUDENT IN AN ORGANIZED HEALTH CARE EDUCATION/TRAINING PROGRAM

## 2024-07-15 RX ORDER — DIAZEPAM 5 MG/1
TABLET ORAL
Qty: 30 TABLET | Refills: 0 | Status: SHIPPED | OUTPATIENT
Start: 2024-07-15 | End: 2024-08-15

## 2024-07-15 RX ORDER — LAMOTRIGINE 25(21)-50
25 KIT ORAL DAILY
Qty: 1 KIT | Refills: 0 | Status: SHIPPED | OUTPATIENT
Start: 2024-07-15

## 2024-07-15 RX ORDER — OMEPRAZOLE 20 MG/1
20 CAPSULE, DELAYED RELEASE ORAL
Qty: 90 CAPSULE | Refills: 1 | Status: SHIPPED | OUTPATIENT
Start: 2024-07-15

## 2024-07-15 RX ORDER — METOPROLOL SUCCINATE 25 MG/1
25 TABLET, EXTENDED RELEASE ORAL DAILY
Qty: 90 TABLET | Refills: 0 | Status: SHIPPED | OUTPATIENT
Start: 2024-07-15

## 2024-07-15 RX ORDER — CITALOPRAM 20 MG/1
20 TABLET ORAL DAILY
Qty: 90 TABLET | Refills: 1 | Status: SHIPPED | OUTPATIENT
Start: 2024-07-15

## 2024-07-15 ASSESSMENT — ENCOUNTER SYMPTOMS
SINUS PRESSURE: 0
ABDOMINAL PAIN: 0
NAUSEA: 0
SINUS PAIN: 0
DIARRHEA: 0
COUGH: 0
RHINORRHEA: 0
BACK PAIN: 0
EYE PAIN: 0
VOMITING: 0
EYE REDNESS: 0
SHORTNESS OF BREATH: 0
SORE THROAT: 0
CONSTIPATION: 0

## 2024-07-15 NOTE — PROGRESS NOTES
7/15/2024    [facility]  Chief Complaint   Patient presents with    Hypertension       Rocio Arroyo is a 53 y.o. female who  has a past medical history of Cancer (HCC), Deep vein blood clot of left lower extremity (HCC), Depression, Fibromyalgia, Hypertension, and Suicide attempt (HCC).     Hypertension:  Patient is here for follow up chronic hypertension.  This is  generally controlled on current medication regimen. Takes meds as directed and tolerates them well.  Most recent labs reviewed with patient and are remarkable.  No symptoms from htn standpoint per ROS.  Patient is  compliant with lifestyle modifications.  Patient does not smoke.      Anxiety:   Rocio Arroyo is a 53 y.o. female who presents for follow up of anxiety disorder. Current symptoms: racing thoughts, fatigue. She denies current suicidal and homicidal ideation. She complains of the following side effects from the treatment: none.      She currently does not have any health insurance.  She had to cancel her colonoscopy due to needing iron infusions due to vaginal bleeding.  She is currently doing okay with the vaginal bleeding last month she did have a period around this time but it was light and just slightly pink.  She has not had any bleeding yet this month.     Review of Systems   Constitutional:  Positive for fatigue. Negative for chills and fever.   HENT:  Negative for congestion, ear pain, postnasal drip, rhinorrhea, sinus pressure, sinus pain and sore throat.    Eyes:  Negative for pain and redness.   Respiratory:  Negative for cough and shortness of breath.    Cardiovascular:  Positive for leg swelling (improved). Negative for chest pain.   Gastrointestinal:  Negative for abdominal pain, constipation, diarrhea, nausea and vomiting.        GERD+    Genitourinary:  Positive for menstrual problem. Negative for difficulty urinating and dysuria.   Musculoskeletal:  Negative for back pain, myalgias and neck pain.   Skin:  Negative for rash.

## 2024-11-20 ENCOUNTER — TELEPHONE (OUTPATIENT)
Dept: FAMILY MEDICINE CLINIC | Age: 54
End: 2024-11-20

## 2024-11-20 NOTE — TELEPHONE ENCOUNTER
----- Message from Karen KELLY sent at 11/20/2024  3:06 PM EST -----  Regarding: [facility] Appointment Request  [facility] Appointment Request    Patient needs appointment for [facility] Appointment Type: Annual Visit.    Patient Requested Dates(s): As soon as possible  Patient Requested Time: Anytime  Provider Name: Hilda Patterson DO      Reason for Appointment Request: Established Patient - No appointments available during search  --------------------------------------------------------------------------------------------------------------------------    Relationship to Patient: Self     Call Back Information: OK to leave message on voicemail  Preferred Call Back Number: Phone [phone]

## 2025-07-01 DIAGNOSIS — F31.81 BIPOLAR 2 DISORDER, MAJOR DEPRESSIVE EPISODE (HCC): ICD-10-CM

## 2025-07-01 DIAGNOSIS — K21.9 GASTROESOPHAGEAL REFLUX DISEASE WITHOUT ESOPHAGITIS: ICD-10-CM

## 2025-07-01 RX ORDER — OMEPRAZOLE 20 MG/1
20 CAPSULE, DELAYED RELEASE ORAL
Qty: 30 CAPSULE | Refills: 0 | Status: SHIPPED | OUTPATIENT
Start: 2025-07-01

## 2025-07-01 RX ORDER — CITALOPRAM HYDROBROMIDE 20 MG/1
20 TABLET ORAL DAILY
Qty: 30 TABLET | Refills: 0 | Status: SHIPPED | OUTPATIENT
Start: 2025-07-01

## 2025-07-01 NOTE — TELEPHONE ENCOUNTER
Name of Medication(s) Requested:  Requested Prescriptions     Pending Prescriptions Disp Refills    citalopram (CELEXA) 20 MG tablet [Pharmacy Med Name: CITALOPRAM HBR 20 MG TABLET] 90 tablet 1     Sig: TAKE 1 TABLET BY MOUTH EVERY DAY    omeprazole (PRILOSEC) 20 MG delayed release capsule [Pharmacy Med Name: OMEPRAZOLE DR 20 MG CAPSULE] 90 capsule 1     Sig: TAKE 1 CAPSULE BY MOUTH EVERY DAY IN THE MORNING BEFORE BREAKFAST       Medication is on current medication list Yes    Dosage and directions were verified? Yes    Quantity verified: 90 day supply     Pharmacy Verified?  Yes    Last Appointment:  7/15/2024    Future appts:  No future appointments.     (If no appt send self scheduling link. .REFILLAPPT)  Scheduling request sent?     [] Yes  [x] No    Does patient need updated?  [] Yes  [x] No